# Patient Record
Sex: FEMALE | Race: WHITE | NOT HISPANIC OR LATINO | Employment: UNEMPLOYED | URBAN - METROPOLITAN AREA
[De-identification: names, ages, dates, MRNs, and addresses within clinical notes are randomized per-mention and may not be internally consistent; named-entity substitution may affect disease eponyms.]

---

## 2017-07-24 ENCOUNTER — OFFICE VISIT (OUTPATIENT)
Dept: OBSTETRICS AND GYNECOLOGY | Facility: CLINIC | Age: 32
End: 2017-07-24
Payer: MEDICAID

## 2017-07-24 ENCOUNTER — PROCEDURE VISIT (OUTPATIENT)
Dept: OBSTETRICS AND GYNECOLOGY | Facility: CLINIC | Age: 32
End: 2017-07-24
Payer: MEDICAID

## 2017-07-24 VITALS
HEIGHT: 66 IN | WEIGHT: 130 LBS | BODY MASS INDEX: 20.89 KG/M2 | RESPIRATION RATE: 16 BRPM | HEART RATE: 74 BPM | DIASTOLIC BLOOD PRESSURE: 68 MMHG | SYSTOLIC BLOOD PRESSURE: 100 MMHG

## 2017-07-24 DIAGNOSIS — Z32.01 PREGNANCY EXAMINATION OR TEST, POSITIVE RESULT: ICD-10-CM

## 2017-07-24 DIAGNOSIS — Z32.00 POSSIBLE PREGNANCY: ICD-10-CM

## 2017-07-24 DIAGNOSIS — O09.30 NO PRENATAL CARE IN CURRENT PREGNANCY, UNSPECIFIED TRIMESTER: ICD-10-CM

## 2017-07-24 DIAGNOSIS — Z32.01 PREGNANCY EXAMINATION OR TEST, POSITIVE RESULT: Primary | ICD-10-CM

## 2017-07-24 DIAGNOSIS — Z12.4 CERVICAL CANCER SCREENING: ICD-10-CM

## 2017-07-24 LAB
B-HCG UR QL: POSITIVE
CTP QC/QA: YES

## 2017-07-24 PROCEDURE — 76805 OB US >/= 14 WKS SNGL FETUS: CPT | Mod: 26,S$PBB,, | Performed by: OBSTETRICS & GYNECOLOGY

## 2017-07-24 PROCEDURE — 99999 PR PBB SHADOW E&M-EST. PATIENT-LVL III: CPT | Mod: PBBFAC,,, | Performed by: OBSTETRICS & GYNECOLOGY

## 2017-07-24 PROCEDURE — 81025 URINE PREGNANCY TEST: CPT | Mod: PBBFAC | Performed by: OBSTETRICS & GYNECOLOGY

## 2017-07-24 PROCEDURE — 99213 OFFICE O/P EST LOW 20 MIN: CPT | Mod: PBBFAC | Performed by: OBSTETRICS & GYNECOLOGY

## 2017-07-24 PROCEDURE — 88175 CYTOPATH C/V AUTO FLUID REDO: CPT

## 2017-07-24 PROCEDURE — 99203 OFFICE O/P NEW LOW 30 MIN: CPT | Mod: S$PBB,TH,25, | Performed by: OBSTETRICS & GYNECOLOGY

## 2017-07-24 PROCEDURE — 87591 N.GONORRHOEAE DNA AMP PROB: CPT

## 2017-07-24 NOTE — PROGRESS NOTES
Subjective:   Patient ID: Nery Brown is a 32 y.o. y.o. female.     Chief Complaint: Missed Menses       History of Present Illness:    Nery presents today complaining of amenorrhea. No LMP recorded (lmp unknown).. Prior menstrual cycles have been irregular. She reports notiving fetal movement for a few weeks and that's how she realized she was pregnant. UPT is positive.       History reviewed. No pertinent past medical history.  History reviewed. No pertinent surgical history.  Social History     Social History    Marital status: Single     Spouse name: N/A    Number of children: N/A    Years of education: N/A     Social History Main Topics    Smoking status: Current Every Day Smoker     Packs/day: 0.50     Types: Cigarettes    Smokeless tobacco: None    Alcohol use No    Drug use: No    Sexual activity: Yes     Partners: Male     Other Topics Concern    None     Social History Narrative    None     Family History   Problem Relation Age of Onset    Ovarian cancer Maternal Grandmother     Hypertension Father     Stroke Father     Breast cancer Sister      OB History    Para Term  AB Living   5 4 2 2 1 4   SAB TAB Ectopic Multiple Live Births         0 1      # Outcome Date GA Lbr Harshal/2nd Weight Sex Delivery Anes PTL Lv   5  //16   2.676 kg (5 lb 14.4 oz) F Vag-Spont None  ALLEN      Complications: Precipitous delivery,Precipitate labor   4 AB            3             2 Term            1 Term                     ROS:   Review of Systems   Constitutional: Negative for chills, diaphoresis, fatigue, fever and unexpected weight change.   HENT: Negative for congestion, hearing loss, rhinorrhea and sore throat.    Eyes: Negative for pain, discharge and visual disturbance.   Respiratory: Negative for apnea, cough, shortness of breath and wheezing.    Cardiovascular: Negative for chest pain, palpitations and leg swelling.   Gastrointestinal: Negative for abdominal pain,  constipation, diarrhea, nausea and vomiting.   Endocrine: Negative for cold intolerance and heat intolerance.   Genitourinary: Negative for difficulty urinating, dyspareunia, dysuria, flank pain, frequency, genital sores, hematuria, menstrual problem, pelvic pain, vaginal bleeding, vaginal discharge and vaginal pain.   Musculoskeletal: Negative for arthralgias, back pain and joint swelling.   Skin: Negative for rash.   Neurological: Negative for dizziness, weakness, light-headedness, numbness and headaches.   Psychiatric/Behavioral: Negative for agitation and confusion. The patient is not nervous/anxious.            Objective:   Vital Signs:  Vitals:    07/24/17 1259   BP: 100/68   Pulse: 74   Resp: 16       Physical Exam:  General:  alert,normal appearing gravid female   Head: Normocephalic, atraumatic   Neck: Supple, Normal ROM   Respiratory: Normal effort   Neuro/Psych: Alert and oriented, appropriate mood and affect   Abdomen:  soft, non-tender; bowel sounds normal, fetal heart tones 133 bpm   Pelvis: External genitalia: normal general appearance  Urinary system: urethral meatus normal, bladder nontender  Vaginal: normal mucosa without prolapse or lesions  Cervix: friable, pap obtained; cervix 0/20/-3  Uterus: gravid  Adnexa: non palpable       Assessment:      1. Pregnancy examination or test, positive result    2. Possible pregnancy    3. Cervical cancer screening          Plan:        Pregnancy examination or test, positive result  -     US OB/GYN Procedure ([facility]) - Extended List; Future  -     Type & Screen - Ob Profile; Future; Expected date: 07/24/2017  -     CBC auto differential; Future; Expected date: 07/24/2017  -     HIV-1 and HIV-2 antibodies; Future; Expected date: 07/24/2017  -     Hepatitis B surface antigen; Future; Expected date: 07/24/2017  -     Rubella antibody, IgG; Future; Expected date: 07/24/2017  -     RPR; Future; Expected date: 07/24/2017  -     C. trachomatis/N. gonorrhoeae by  AMP DNA Cervix; Standing    Possible pregnancy  -     POCT Urine Pregnancy    Cervical cancer screening  -     Liquid-based pap smear, screening        1. Pap today  2. Prenatal labs ordered and GC/CT performed.  3. Ultrasound today  4. Follow up in 1 weeks.    Patient was counseled today on proper weight gain based on the [facility]'s recommendations based on her pre-pregnancy weight. Discussed foods to avoid in pregnancy (i.e. sushi, fish that are high in mercury, deli meat, and unpasteurized cheeses). Discussed prenatal vitamin options (i.e. stool softener, DHA). She was also counseled on safe, healthy behavior as well as medications safe in pregnancy.

## 2017-07-27 LAB
C TRACH DNA SPEC QL NAA+PROBE: NOT DETECTED
N GONORRHOEA DNA SPEC QL NAA+PROBE: NOT DETECTED

## 2017-08-14 ENCOUNTER — TELEPHONE (OUTPATIENT)
Dept: OBSTETRICS AND GYNECOLOGY | Facility: CLINIC | Age: 32
End: 2017-08-14

## 2017-08-14 DIAGNOSIS — Z34.81 ENCOUNTER FOR SUPERVISION OF OTHER NORMAL PREGNANCY IN FIRST TRIMESTER: Primary | ICD-10-CM

## 2017-09-09 ENCOUNTER — HOSPITAL ENCOUNTER (INPATIENT)
Facility: HOSPITAL | Age: 32
LOS: 1 days | Discharge: HOME | End: 2017-09-10
Attending: OBSTETRICS & GYNECOLOGY | Admitting: OBSTETRICS & GYNECOLOGY

## 2017-09-09 DIAGNOSIS — O09.33 NO PRENATAL CARE IN CURRENT PREGNANCY IN THIRD TRIMESTER: Primary | ICD-10-CM

## 2017-09-09 DIAGNOSIS — O09.893 HISTORY OF PRETERM DELIVERY, CURRENTLY PREGNANT IN THIRD TRIMESTER: ICD-10-CM

## 2017-09-09 DIAGNOSIS — F19.11 HISTORY OF DRUG ABUSE: ICD-10-CM

## 2017-09-09 LAB
ABO + RH BLD: NORMAL
AMPHET+METHAMPHET UR QL: NORMAL
BARBITURATES UR QL SCN>200 NG/ML: NEGATIVE
BASOPHILS # BLD AUTO: 0.04 K/UL
BASOPHILS NFR BLD: 0.3 %
BENZODIAZ UR QL SCN>200 NG/ML: NEGATIVE
BLD GP AB SCN CELLS X3 SERPL QL: NORMAL
BZE UR QL SCN: NEGATIVE
CANNABINOIDS UR QL SCN: NEGATIVE
CREAT UR-MCNC: 49 MG/DL
DIFFERENTIAL METHOD: ABNORMAL
EOSINOPHIL # BLD AUTO: 0.1 K/UL
EOSINOPHIL NFR BLD: 0.9 %
ERYTHROCYTE [DISTWIDTH] IN BLOOD BY AUTOMATED COUNT: 14.4 %
FETAL CELL SCN BLD QL ROSETTE: NORMAL
HCT VFR BLD AUTO: 29.8 %
HGB BLD-MCNC: 9.4 G/DL
LYMPHOCYTES # BLD AUTO: 1.7 K/UL
LYMPHOCYTES NFR BLD: 11.7 %
MCH RBC QN AUTO: 27.9 PG
MCHC RBC AUTO-ENTMCNC: 31.5 G/DL
MCV RBC AUTO: 88 FL
METHADONE UR QL SCN>300 NG/ML: NEGATIVE
MONOCYTES # BLD AUTO: 1 K/UL
MONOCYTES NFR BLD: 7 %
NEUTROPHILS # BLD AUTO: 11.4 K/UL
NEUTROPHILS NFR BLD: 80.1 %
OPIATES UR QL SCN: NEGATIVE
PCP UR QL SCN>25 NG/ML: NEGATIVE
PLATELET # BLD AUTO: 290 K/UL
PMV BLD AUTO: 10 FL
RBC # BLD AUTO: 3.37 M/UL
TOXICOLOGY INFORMATION: NORMAL
WBC # BLD AUTO: 14.24 K/UL

## 2017-09-09 PROCEDURE — 25000003 PHARM REV CODE 250: Performed by: OBSTETRICS & GYNECOLOGY

## 2017-09-09 PROCEDURE — 86701 HIV-1ANTIBODY: CPT

## 2017-09-09 PROCEDURE — 72100002 HC LABOR CARE, 1ST 8 HOURS

## 2017-09-09 PROCEDURE — 86901 BLOOD TYPING SEROLOGIC RH(D): CPT

## 2017-09-09 PROCEDURE — 86592 SYPHILIS TEST NON-TREP QUAL: CPT

## 2017-09-09 PROCEDURE — 80307 DRUG TEST PRSMV CHEM ANLYZR: CPT

## 2017-09-09 PROCEDURE — 36415 COLL VENOUS BLD VENIPUNCTURE: CPT

## 2017-09-09 PROCEDURE — 72200004 HC VAGINAL DELIVERY LEVEL I

## 2017-09-09 PROCEDURE — 27000339 *HC DAILY SUPPLY KIT

## 2017-09-09 PROCEDURE — 27200120 HC KIT IV START (RUSH ONLY)

## 2017-09-09 PROCEDURE — 85461 HEMOGLOBIN FETAL: CPT

## 2017-09-09 PROCEDURE — 86900 BLOOD TYPING SEROLOGIC ABO: CPT

## 2017-09-09 PROCEDURE — 59409 OBSTETRICAL CARE: CPT | Mod: ,,, | Performed by: OBSTETRICS & GYNECOLOGY

## 2017-09-09 PROCEDURE — 85025 COMPLETE CBC W/AUTO DIFF WBC: CPT

## 2017-09-09 PROCEDURE — 11000001 HC ACUTE MED/SURG PRIVATE ROOM

## 2017-09-09 PROCEDURE — 99201 *HC E&M-NEW PATIENT-LVL I: CPT

## 2017-09-09 RX ORDER — IBUPROFEN 800 MG/1
800 TABLET ORAL EVERY 8 HOURS
Status: CANCELLED | OUTPATIENT
Start: 2017-09-10

## 2017-09-09 RX ORDER — DIPHENHYDRAMINE HYDROCHLORIDE 50 MG/ML
25 INJECTION INTRAMUSCULAR; INTRAVENOUS EVERY 4 HOURS PRN
Status: DISCONTINUED | OUTPATIENT
Start: 2017-09-09 | End: 2017-09-10 | Stop reason: HOSPADM

## 2017-09-09 RX ORDER — PRENATAL WITH FERROUS FUM AND FOLIC ACID 3080; 920; 120; 400; 22; 1.84; 3; 20; 10; 1; 12; 200; 27; 25; 2 [IU]/1; [IU]/1; MG/1; [IU]/1; MG/1; MG/1; MG/1; MG/1; MG/1; MG/1; UG/1; MG/1; MG/1; MG/1; MG/1
1 TABLET ORAL DAILY
Status: DISCONTINUED | OUTPATIENT
Start: 2017-09-09 | End: 2017-09-10 | Stop reason: HOSPADM

## 2017-09-09 RX ORDER — SODIUM CHLORIDE, SODIUM LACTATE, POTASSIUM CHLORIDE, CALCIUM CHLORIDE 600; 310; 30; 20 MG/100ML; MG/100ML; MG/100ML; MG/100ML
INJECTION, SOLUTION INTRAVENOUS CONTINUOUS
Status: DISCONTINUED | OUTPATIENT
Start: 2017-09-09 | End: 2017-09-09

## 2017-09-09 RX ORDER — BUTORPHANOL TARTRATE 2 MG/ML
2 INJECTION INTRAMUSCULAR; INTRAVENOUS
Status: DISCONTINUED | OUTPATIENT
Start: 2017-09-09 | End: 2017-09-09

## 2017-09-09 RX ORDER — MISOPROSTOL 200 UG/1
600 TABLET ORAL
Status: DISCONTINUED | OUTPATIENT
Start: 2017-09-09 | End: 2017-09-09

## 2017-09-09 RX ORDER — KETOROLAC TROMETHAMINE 30 MG/ML
30 INJECTION, SOLUTION INTRAMUSCULAR; INTRAVENOUS EVERY 6 HOURS
Status: CANCELLED | OUTPATIENT
Start: 2017-09-09 | End: 2017-09-10

## 2017-09-09 RX ORDER — SIMETHICONE 80 MG
1 TABLET,CHEWABLE ORAL EVERY 6 HOURS PRN
Status: DISCONTINUED | OUTPATIENT
Start: 2017-09-09 | End: 2017-09-10 | Stop reason: HOSPADM

## 2017-09-09 RX ORDER — ONDANSETRON 2 MG/ML
4 INJECTION INTRAMUSCULAR; INTRAVENOUS EVERY 8 HOURS PRN
Status: DISCONTINUED | OUTPATIENT
Start: 2017-09-09 | End: 2017-09-10 | Stop reason: HOSPADM

## 2017-09-09 RX ORDER — ACETAMINOPHEN 325 MG/1
650 TABLET ORAL EVERY 6 HOURS PRN
Status: DISCONTINUED | OUTPATIENT
Start: 2017-09-09 | End: 2017-09-10 | Stop reason: HOSPADM

## 2017-09-09 RX ORDER — DIPHENHYDRAMINE HCL 25 MG
25 CAPSULE ORAL EVERY 4 HOURS PRN
Status: DISCONTINUED | OUTPATIENT
Start: 2017-09-09 | End: 2017-09-10 | Stop reason: HOSPADM

## 2017-09-09 RX ORDER — OXYCODONE AND ACETAMINOPHEN 5; 325 MG/1; MG/1
1 TABLET ORAL EVERY 4 HOURS PRN
Status: DISCONTINUED | OUTPATIENT
Start: 2017-09-09 | End: 2017-09-10 | Stop reason: HOSPADM

## 2017-09-09 RX ORDER — OXYTOCIN/RINGER'S LACTATE 20/1000 ML
41.65 PLASTIC BAG, INJECTION (ML) INTRAVENOUS CONTINUOUS
Status: ACTIVE | OUTPATIENT
Start: 2017-09-09 | End: 2017-09-09

## 2017-09-09 RX ORDER — DOCUSATE SODIUM 100 MG/1
200 CAPSULE, LIQUID FILLED ORAL 2 TIMES DAILY PRN
Status: DISCONTINUED | OUTPATIENT
Start: 2017-09-09 | End: 2017-09-10 | Stop reason: HOSPADM

## 2017-09-09 RX ORDER — HYDROCORTISONE 25 MG/G
CREAM TOPICAL 3 TIMES DAILY PRN
Status: DISCONTINUED | OUTPATIENT
Start: 2017-09-09 | End: 2017-09-10 | Stop reason: HOSPADM

## 2017-09-09 RX ORDER — OXYTOCIN/RINGER'S LACTATE 20/1000 ML
2 PLASTIC BAG, INJECTION (ML) INTRAVENOUS CONTINUOUS
Status: DISCONTINUED | OUTPATIENT
Start: 2017-09-09 | End: 2017-09-09

## 2017-09-09 RX ORDER — IBUPROFEN 600 MG/1
600 TABLET ORAL EVERY 6 HOURS PRN
Status: DISCONTINUED | OUTPATIENT
Start: 2017-09-09 | End: 2017-09-10 | Stop reason: HOSPADM

## 2017-09-09 RX ORDER — ONDANSETRON 4 MG/1
8 TABLET, ORALLY DISINTEGRATING ORAL EVERY 8 HOURS PRN
Status: DISCONTINUED | OUTPATIENT
Start: 2017-09-09 | End: 2017-09-09

## 2017-09-09 RX ADMIN — IBUPROFEN 600 MG: 600 TABLET, FILM COATED ORAL at 08:09

## 2017-09-09 RX ADMIN — OXYCODONE HYDROCHLORIDE AND ACETAMINOPHEN 1 TABLET: 5; 325 TABLET ORAL at 05:09

## 2017-09-09 RX ADMIN — OXYCODONE HYDROCHLORIDE AND ACETAMINOPHEN 1 TABLET: 5; 325 TABLET ORAL at 11:09

## 2017-09-09 RX ADMIN — Medication 333 MILLI-UNITS/MIN: at 11:09

## 2017-09-09 RX ADMIN — IBUPROFEN 600 MG: 600 TABLET, FILM COATED ORAL at 11:09

## 2017-09-09 RX ADMIN — Medication 41.65 MILLI-UNITS/MIN: at 12:09

## 2017-09-09 RX ADMIN — OXYCODONE HYDROCHLORIDE AND ACETAMINOPHEN 1 TABLET: 5; 325 TABLET ORAL at 09:09

## 2017-09-09 NOTE — L&D DELIVERY NOTE
Delivery Information for  Adryan Brown    Birth information:  YOB: 2017   Time of birth: 11:03 AM   Sex: male   Head Delivery Date/Time: 2017 11:03 AM   Delivery type: Vaginal, Spontaneous Delivery   Gestational Age: 33w1d    Delivery Providers    Delivering clinician:  Bashir Sutton MD   Other personnel:   Provider Role   Madelin Priest RN Registered Nurse   Flaquita Chen, RN Registered Nurse   Shelly Velazco Technician   Dang Engle, JARERLL                 Measurements    Weight:  2336 g Length:  47 cm          [place] Assessment    Living status:  Living  Apgars:     1 Minute:   5 Minute:   10 Minute:   15 Minute:   20 Minute:     Skin Color:   1  1       Heart Rate:   2  2       Reflex Irritability:   2  2       Muscle Tone:   1  1       Respiratory Effort:   1  2       Total:   7  8               Apgars Assigned By:  MADELIN PRIEST RN         Assisted Delivery Details:    Forceps attempted?:  No  Vacuum extractor attempted?:  No         Shoulder Dystocia    Shoulder dystocia present?:  No           Presentation and Position    Presentation:   Vertex   Position:   Left    Occiput    Anterior            Interventions/Resuscitation    Method:  Bulb Suctioning, Tactile Stimulation       Cord    Vessels:  3 vessels  Complications:  None  Delayed Cord Clamping?:  No  Cord Clamped Date/Time:  2017 11:05 AM  Cord Blood Disposition:  Lab  Gases Sent?:  No  Stem Cell Collection (by MD):  No       Placenta    Date and time:  2017 11:06 AM  Removal:  Spontaneous  Appearance:  Intact  Placenta disposition:  discarded           Labor Events:       labor:       Labor Onset Date/Time:         Dilation Complete Date/Time:         Start Pushing Date/Time:       Rupture Date/Time:              Rupture type:           Fluid Amount:        Fluid Color:        Fluid Odor:        Membrane Status (PeriCalm):        Rupture Date/Time (PeriCalm):        Fluid Amount (PeriCalm):         Fluid Color (PeriCalm):         steroids: None     Antibiotics given for GBS: No     Induction:       Indications for induction:        Augmentation:       Indications for augmentation:       Labor complications:       Additional complications: Precipitate Labor;Precipitous Delivery        Cervical ripening:                     Delivery:      Episiotomy: None     Indication for Episiotomy:       Perineal Lacerations: None Repaired:      Periurethral Laceration: none Repaired:     Labial Laceration: none Repaired:     Sulcus Laceration: none Repaired:     Vaginal Laceration: No Repaired:     Cervical Laceration: No Repaired:     Repair suture: None     Repair # of packets: 0     Vaginal delivery QBL (mL): 50      QBL (mL): 0     Combined Blood Loss (mL): 50     Vaginal Sweep Performed: Yes     Surgicount Correct:         Other providers:       Anesthesia    Method:  None          Details (if applicable):  Trial of Labor      Categorization:      Priority:     Indications for :     Incision Type:       Additional  information:  Forceps:    Vacuum:    Breech:    Observed anomalies    Other (Comments): Id band number [id number]

## 2017-09-09 NOTE — H&P
[facility]  Obstetrics  History & Physical    Patient Name: Nery Brown  MRN: [id number]  Admission Date: 2017  Primary Care Provider: Primary Doctor No    Subjective:     Principal Problem:<principal problem not specified>     History of Present Illness:Patient presents to labor and delivery in the throws of active labor.  Patient states that she is had one prenatal visit although limited records are available.  Patient gives a history of this being her fourth pregnancy with 3 other  deliveries.  Patient reports a due date of 2017.  This would make the patient is 6 weeks.  Patient reports a history of drug abuse but states her last use was a month ago.  Patient appears to be under the influence at present.  Patient presented completely dilated and       Obstetric HPI:  Patient reports strong uterine contractions since 8 AM this morning , active fetal movement, No vaginal bleeding , Yes loss of fluid and tannish ruptured membranes at delivery clear fluid     This pregnancy has been complicated by no prenatal care, history of drug abuse, history of  delivery,  delivery    Obstetric History       T2      L4     SAB0   TAB0   Ectopic0   Multiple0   Live Births1       # Outcome Date GA Lbr Harshal/2nd Weight Sex Delivery Anes PTL Lv   6 Current            5  16   2.676 kg (5 lb 14.4 oz) F Vag-Spont None  ALLEN      Name: LESLIE, DAVID KINGSLEY      Complications: Precipitous delivery,Precipitate labor      Apgar1:  7                Apgar5: 7   4 AB            3             2 Term            1 Term                 No past medical history on file.  No past surgical history on file.    PTA Medications   Medication Sig    PNV,calcium 72-iron-folic acid (PRENATAL PLUS, CALCIUM CARB,) 27 mg iron- 1 mg Tab Take 1 tablet (1 each total) by mouth once daily.    PRENATAL VIT/IRON FUMARATE/FA (PRENATAL 1+1 ORAL) Take by mouth.       Review of  patient's allergies indicates:  No Known Allergies     Family History     Problem Relation (Age of Onset)    Breast cancer Sister    Hypertension Father    Ovarian cancer Maternal Grandmother    Stroke Father        Social History Main Topics    Smoking status: Current Every Day Smoker     Packs/day: 0.50     Types: Cigarettes    Smokeless tobacco: Not on file    Alcohol use No    Drug use: No    Sexual activity: Yes     Partners: Male     Review of Systems   All other systems reviewed and are negative.     Objective:     Vital Signs (Most Recent):    Vital Signs (24h Range):           There is no height or weight on file to calculate BMI.    FHT: Unable  to obtain in a monitoring for delivery    Physical Exam:   Constitutional: She appears well-developed and well-nourished.       Cardiovascular: Normal rate, regular rhythm and normal heart sounds.     Pulmonary/Chest: Effort normal and breath sounds normal.        Abdominal: Soft. Bowel sounds are normal.                       Cervix:  Dilation:  10  Effacement:  100%  Station: 3  Presentation: Vertex     Significant Labs:  Lab Results   Component Value Date    GROUPTRH A NEG 2016    HEPBSAG Negative 2016       None    Assessment/Plan:     32 y.o. female  at Unknown for:    History of  delivery, currently pregnant in third trimester    Precipitous delivery        History of drug abuse    Will obtain urine drug screen        No prenatal care in current pregnancy in third trimester    Obtain prenatal lab work            Bashir Sutton MD  Obstetrics  [facility]

## 2017-09-09 NOTE — HOSPITAL COURSE
Patient presented and delivered precipitously.  Placenta delivered without difficulty.  See delivery note  Postpartum day #1 Patient resting comfortably this morning.  Patient's baby was transferred to [facility] and she would like to go and see her baby.  Patient desires to smoke.  Patient has not seen  this hospitalization however she has seen  with a previous pregnancy and will follow-up at [facility].

## 2017-09-09 NOTE — PROGRESS NOTES
"Patient came into L&D dept per W/C per ER tech, patient verbally hollering and sitting with buttock off of chair stating, "I need to pooh".  Assisted to bed, dark brown mucous plug noted to introitus and on thigh. EFM/[place] applied, VS taken, assisted patient to blow with ctx's, patient pushing with ctx's, encouraged patient to relax and blow with ctx's, SVE reveals 10 cm, sta +1, water bag bulging, called for help, precip tray readily available in bed.  "

## 2017-09-09 NOTE — SUBJECTIVE & OBJECTIVE
Obstetric HPI:  Patient reports strong uterine contractions since 8 AM this morning , active fetal movement, No vaginal bleeding , Yes loss of fluid and tannish ruptured membranes at delivery clear fluid     This pregnancy has been complicated by no prenatal care, history of drug abuse, history of  delivery,  delivery    Obstetric History       T2      L4     SAB0   TAB0   Ectopic0   Multiple0   Live Births1       # Outcome Date GA Lbr Harshal/2nd Weight Sex Delivery Anes PTL Lv   6 Current            5  16   2.676 kg (5 lb 14.4 oz) F Vag-Spont None  ALLEN      Name: DAVID NELSON      Complications: Precipitous delivery,Precipitate labor      Apgar1:  7                Apgar5: 7   4 AB            3             2 Term            1 Term                 No past medical history on file.  No past surgical history on file.    PTA Medications   Medication Sig    PNV,calcium 72-iron-folic acid (PRENATAL PLUS, CALCIUM CARB,) 27 mg iron- 1 mg Tab Take 1 tablet (1 each total) by mouth once daily.    PRENATAL VIT/IRON FUMARATE/FA (PRENATAL 1+1 ORAL) Take by mouth.       Review of patient's allergies indicates:  No Known Allergies     Family History     Problem Relation (Age of Onset)    Breast cancer Sister    Hypertension Father    Ovarian cancer Maternal Grandmother    Stroke Father        Social History Main Topics    Smoking status: Current Every Day Smoker     Packs/day: 0.50     Types: Cigarettes    Smokeless tobacco: Not on file    Alcohol use No    Drug use: No    Sexual activity: Yes     Partners: Male     Review of Systems   All other systems reviewed and are negative.     Objective:     Vital Signs (Most Recent):    Vital Signs (24h Range):           There is no height or weight on file to calculate BMI.    FHT: Unable  to obtain in a monitoring for delivery    Physical Exam:   Constitutional: She appears well-developed and well-nourished.       Cardiovascular:  Normal rate, regular rhythm and normal heart sounds.     Pulmonary/Chest: Effort normal and breath sounds normal.        Abdominal: Soft. Bowel sounds are normal.                       Cervix:  Dilation:  10  Effacement:  100%  Station: 3  Presentation: Vertex     Significant Labs:  Lab Results   Component Value Date    [facility]    HEPBSAG Negative 08/23/2016       None

## 2017-09-09 NOTE — PROGRESS NOTES
Delivered  supporting perineum, head delivered with gentle pushing per patient, shoulders delivered, and  delivered, time noted, Dr SCHUYLER Sutton walked in behind me immediately after  delivered and attended to patient for the cord and placenta, assisted with  at warmer.

## 2017-09-09 NOTE — PLAN OF CARE
Problem: Patient Care Overview  Goal: Plan of Care Review  Outcome: Ongoing (interventions implemented as appropriate)  Pt stable, ambulating and voiding without difficulty. Belle regular diet. Pain controlled. Infant transported to [facility] [place]. Emotional support given to pt. Pt's significant other visited pt shortly after infant was transported. Pt plans to formula feed. States she will visit infant at [facility] tomorrow once she is discharged. .

## 2017-09-10 VITALS
OXYGEN SATURATION: 100 % | RESPIRATION RATE: 18 BRPM | SYSTOLIC BLOOD PRESSURE: 120 MMHG | HEIGHT: 66 IN | HEART RATE: 84 BPM | DIASTOLIC BLOOD PRESSURE: 77 MMHG | WEIGHT: 130 LBS | BODY MASS INDEX: 20.89 KG/M2 | TEMPERATURE: 96 F

## 2017-09-10 LAB
BASOPHILS # BLD AUTO: 0.03 K/UL
BASOPHILS NFR BLD: 0.3 %
DIFFERENTIAL METHOD: ABNORMAL
EOSINOPHIL # BLD AUTO: 0.1 K/UL
EOSINOPHIL NFR BLD: 1.2 %
ERYTHROCYTE [DISTWIDTH] IN BLOOD BY AUTOMATED COUNT: 14.2 %
HCT VFR BLD AUTO: 25.9 %
HGB BLD-MCNC: 8.1 G/DL
HIV1+2 IGG SERPL QL IA.RAPID: NEGATIVE
LYMPHOCYTES # BLD AUTO: 2.4 K/UL
LYMPHOCYTES NFR BLD: 20.7 %
MCH RBC QN AUTO: 27.7 PG
MCHC RBC AUTO-ENTMCNC: 31.3 G/DL
MCV RBC AUTO: 89 FL
MONOCYTES # BLD AUTO: 0.7 K/UL
MONOCYTES NFR BLD: 5.7 %
NEUTROPHILS # BLD AUTO: 8.3 K/UL
NEUTROPHILS NFR BLD: 72.1 %
PLATELET # BLD AUTO: 318 K/UL
PMV BLD AUTO: 10.1 FL
RBC # BLD AUTO: 2.92 M/UL
TSH SERPL DL<=0.005 MIU/L-ACNC: 2.31 UIU/ML
WBC # BLD AUTO: 11.46 K/UL

## 2017-09-10 PROCEDURE — 86703 HIV-1/HIV-2 1 RESULT ANTBDY: CPT

## 2017-09-10 PROCEDURE — 81220 CFTR GENE COM VARIANTS: CPT

## 2017-09-10 PROCEDURE — 99238 HOSP IP/OBS DSCHRG MGMT 30/<: CPT | Mod: ,,, | Performed by: OBSTETRICS & GYNECOLOGY

## 2017-09-10 PROCEDURE — 25000003 PHARM REV CODE 250: Performed by: OBSTETRICS & GYNECOLOGY

## 2017-09-10 PROCEDURE — 84443 ASSAY THYROID STIM HORMONE: CPT

## 2017-09-10 PROCEDURE — 86762 RUBELLA ANTIBODY: CPT

## 2017-09-10 PROCEDURE — 85025 COMPLETE CBC W/AUTO DIFF WBC: CPT

## 2017-09-10 PROCEDURE — 87340 HEPATITIS B SURFACE AG IA: CPT

## 2017-09-10 PROCEDURE — 36415 COLL VENOUS BLD VENIPUNCTURE: CPT

## 2017-09-10 RX ORDER — IBUPROFEN 600 MG/1
600 TABLET ORAL EVERY 6 HOURS PRN
Qty: 20 TABLET | Refills: 2 | Status: SHIPPED | OUTPATIENT
Start: 2017-09-10 | End: 2018-12-10 | Stop reason: ALTCHOICE

## 2017-09-10 RX ADMIN — PRENATAL VIT W/ FE FUMARATE-FA TAB 27-0.8 MG 1 EACH: 27-0.8 TAB at 08:09

## 2017-09-10 RX ADMIN — OXYCODONE HYDROCHLORIDE AND ACETAMINOPHEN 1 TABLET: 5; 325 TABLET ORAL at 08:09

## 2017-09-10 RX ADMIN — IBUPROFEN 600 MG: 600 TABLET, FILM COATED ORAL at 04:09

## 2017-09-10 RX ADMIN — OXYCODONE HYDROCHLORIDE AND ACETAMINOPHEN 1 TABLET: 5; 325 TABLET ORAL at 04:09

## 2017-09-10 NOTE — NURSING
RN to room to do vital signs, give pain medication and to give Rhogam shot.  At this time lab also to room for blood work.  Pt refuses to receive Rhogam shot at this time states it hurts too bad and she wishes to sleep not hurt.  Rhogam sent back to lab and pt instructed that she will receive it later in the day when she is more awake.

## 2017-09-10 NOTE — DISCHARGE SUMMARY
[facility] Mother Baby Unit  Obstetrics  Discharge Summary      Patient Name: Nery Brown  MRN: [id number]  Admission Date: 2017  Hospital Length of Stay: 1 days  Discharge Date and Time:  09/10/2017 9:54 AM  Attending Physician: Bashir Sutton MD   Discharging Provider: Bashir Sutton MD  Primary Care Provider: Primary Doctor No    HPI: Patient presents to labor and delivery in the throws of active labor.  Patient states that she is had one prenatal visit although limited records are available.  Patient gives a history of this being her fourth pregnancy with 3 other  deliveries.  Patient reports a due date of 2017.  This would make the patient is 6 weeks.  Patient reports a history of drug abuse but states her last use was a month ago.  Patient appears to be under the influence at present.  Patient presented completely dilated and .    * No surgery found *     Hospital Course:   Patient presented and delivered precipitously.  Placenta delivered without difficulty.  See delivery note  Postpartum day #1 Patient resting comfortably this morning.  Patient's baby was transferred to [facility] and she would like to go and see her baby.  Patient desires to smoke.  Patient has not seen  this hospitalization however she has seen  with a previous pregnancy and will follow-up at [facility].    Consults         Status Ordering Provider     Inpatient consult to Social Work  Once     Provider:  (Not yet assigned)    Acknowledged BASHIR SUTTON          Final Active Diagnoses:    Diagnosis Date Noted POA    PRINCIPAL PROBLEM:   labor in third trimester with  delivery [O60.14X0] 2017 Yes    No prenatal care in current pregnancy in third trimester [O09.33] 2017 Not Applicable    History of drug abuse [Z87.898] 2017 Yes    History of  delivery, currently pregnant in third trimester [O09.213] 2017 Not Applicable       Problems Resolved During this Admission:    Diagnosis Date Noted Date Resolved POA            Feeding Method: bottle    Immunizations     Date Immunization Status Dose Route/Site Given by    09/09/17 1231 MMR Incomplete 0.5 mL Subcutaneous/Left deltoid     09/09/17 1425 Tdap Deleted 0.5 mL Intramuscular/Left deltoid     09/09/17 1134 Rho (D) Immune Globulin - IM Incomplete 300 mcg Intramuscular/     09/09/17 1525 Tdap Incomplete 0.5 mL Intramuscular/Left deltoid           Delivery:    Episiotomy: None   Lacerations: None   Repair suture: None   Repair # of packets: 0   Blood loss (ml): 50     Birth information:  YOB: 2017   Time of birth: 11:03 AM   Sex: male   Delivery type: Vaginal, Spontaneous Delivery   Gestational Age: 33w1d    Delivery Clinician:      Other providers:       Additional  information:  Forceps:    Vacuum:    Breech:    Observed anomalies      Living?:           APGARS  One minute Five minutes Ten minutes   Skin color:         Heart rate:         Grimace:         Muscle tone:         Breathing:         Totals: 7  8        Placenta: Delivered:       appearance    Pending Diagnostic Studies:     Procedure Component Value Units Date/Time    RPR [[id number]] Collected:  09/09/17 1131    Order Status:  Sent Lab Status:  In process Updated:  09/09/17 1135    Specimen:  Blood from Blood           Discharged Condition: good    Disposition:     Follow Up:    Patient Instructions:     Cystic Fibrosis Mutation Panel   Standing Status: Future  Standing Exp. Date: 11/08/18     Hepatitis B surface antigen   Standing Status: Future  Standing Exp. Date: 11/08/18     HIV-1 and HIV-2 antibodies   Standing Status: Future  Standing Exp. Date: 11/08/18     Rubella antibody, IgG   Standing Status: Future  Standing Exp. Date: 11/08/18     TSH   Standing Status: Future  Standing Exp. Date: 11/08/18       Medications:  Current Discharge Medication List      CONTINUE these medications which have NOT CHANGED     Details   PNV,calcium 72-iron-folic acid (PRENATAL PLUS, CALCIUM CARB,) 27 mg iron- 1 mg Tab Take 1 tablet (1 each total) by mouth once daily.  Qty: 30 tablet, Refills: 11    Comments: OK to substitute comparable prenatal vitamin covered by pt's insurance  Associated Diagnoses: Encounter for supervision of other normal pregnancy in first trimester      PRENATAL VIT/IRON FUMARATE/FA (PRENATAL 1+1 ORAL) Take by mouth.             Bashir Sutton MD  Obstetrics  [facility] - Mother Baby Unit

## 2017-09-10 NOTE — NURSING
"Pt refused rhogam injection. States "It hurts too much". Discussed the importance of receiving rhogam for further pregnancies and having babies with + blood types. Pt states "I'm not having any more kids so I don't want it."  Will notify MD.  "

## 2017-09-10 NOTE — DISCHARGE INSTRUCTIONS
Postpartum Discharge Instructions:    · No heavy lifting, straining, frequent rest periods  · Pelvic rest--no douching, tampons, or intercourse until released by MD  · Talk to your doctor about birth control--remember breastfeeding is not a method of birth control  · Notify MD if bleeding becomes heavier than usual and if large clots, painful cramping,or foul odor develops.   Vaginal discharge will lighten and decrease in amount gradually.    · Cleanse perineal area from front to back after urination or having a bowel movement.  · Tub soak or portable sitz bath at home.  Apply clean pad with Epifoam and Tucks to perineal area.  · Episiotomy stitches will dissolve within 2-3 weeks  · If not breastfeeding, wear tight fitting sports bra for 1 week--remove only to bathe  · Remember to keep your breast clean and dry to prevent any cracking  · Notify MD if breast become reddened,swollen, nipples bleed or crack, or fever greater than 100.4  · Notify MD of pain, swelling,  Redness, or heat developing in back of leg especially when foot is flexed toward body  · Look at incision everyday for redness, swelling, or drainage which may indicate infection  · Notify MD of pain not relieved by pain medication.  · Call MD or go to ER for any concerns  · Follow up in 6 weeks      After a Vaginal Birth  After having a baby, your body may be very tired. It can take time to recover from a vaginal delivery. You may stay in the hospital or birth center from 1 to 4 days. In some cases, you may be able to go home the same day.    Right after the delivery  Your temperature and blood pressure will be taken until they are stable. A nurse or other healthcare provider will observe you as you rest. You may have afterbirth pains. These are cramps caused by the uterus shrinking. Sanitary pads are used to soak up the discharge of the uterine lining. To make sure that you arent bleeding too much, the pad will be checked. And the firmness of your  uterus will be checked. To do this, a nurse will gently push down on your stomach. If you had anesthesia, youll be watched closely until you can feel and move your toes. If you have perineal pain (pain between the vagina and anus), an ice pack can help.  [place] care  While still in the hospital or birth center, youll learn how to hold and feed your baby. You will also be given instructions on how to care for your baby. This includes bathing and feeding.   Preparing to go home  You may be anxious to go home as soon as possible. Before you and your baby go home, a healthcare provider will check to be sure you are healthy enough to take care of your baby and yourself. Youre ready to go home when:  · You can walk to the bathroom and use the bathroom without help.  · You can eat solid food and swallow pills (if needed).  · You have no sign of infection or other health problems, including fever.   · You have adequate pain control.  · Your bleeding isn't excessive.  · You are able to care for your  and are emotionally stable.  Before leaving the hospital or birth center, youll be given written instructions for home self-care after vaginal delivery. Be sure to follow these instructions carefully. If you have questions or concerns, talk about them now.  If you have stitches  You may have received stitches in the skin near your vagina. The stitches might have closed an episiotomy (an incision that enlarges the opening of the vagina). Or you may have needed stitches to repair torn skin. Either way, your stitches should dissolve within weeks. Until then, you can help reduce discomfort, aid healing, and reduce your risk of infection by keeping the stitches clean. These tips can help:  · Gently wipe from front to back after you urinate or have a bowel movement.  · After wiping, spray warm water on the area. Or you can have a sitz bath. This means sitting in a tub with a few inches of water in it. Then pat the area dry  or use a hairdryer on a cool setting.  · Do not use soap or any solution except water on the area.  · You can take a shower unless told not to.  · Change sanitary pads at least every 2 to 4 hours.  · Place cold or heat packs on the area as directed by your healthcare providers or nurses. Keep a thin towel between the pack and your skin.  · Sit on firm seats so the stitches pull less.   follow-up  Schedule a  follow-up exam with your healthcare provider for about 6 weeks after delivery. During this exam, your uterus and vaginal area will be checked. Contact your healthcare provider if you think you or your baby are having any problems.  When to call your healthcare provider  Call your health care provider right away if you have:  · A fever of 100.4°F (38.0°C) or higher  · Bleeding that requires a new sanitary pad after an hour, or large blood clots  · Pain in your vagina that gets worse and isn't relieved with medicine  · Swelling, discharge, or increased pain from vaginal tear or episiotomy  · Burning, pain, red streaks, or lumpy areas in your breasts that may be accompanied by flu-like symptoms  · Cracks, blisters, or blood on your nipples  · Burning or pain when you urinate  · Nausea or vomiting  · Dizziness or fainting  · Feelings of extreme sadness or anxiety, or a feeling that you dont want to be with your baby  · Abdominal pain that isnt relieved with medicine  · Vaginal discharge that has a bad odor  · No bowel movement for 5 days  · Painful urination, orinability to control urination  · Redness, warmth, or pain in the lower leg  · Chest pain   Date Last Reviewed: 2015  © 8718-1533 [employer]. [address] [place] [address], [place] [address]. All rights reserved. This information is not intended as a substitute for professional medical care. Always follow your healthcare professional's instructions.

## 2017-09-10 NOTE — ASSESSMENT & PLAN NOTE
Obtain prenatal lab work  Postpartum day #1  prenatal labwork's do not appear to have been drawn.  Spoke with the lab will obtain blood draw before patient is discharged home

## 2017-09-10 NOTE — DISCHARGE SUMMARY
[facility]  Obstetrics  Discharge Summary      Patient Name: Nery Brown  MRN: [id number]  Admission Date: 2017  Hospital Length of Stay: 1 days  Discharge Date and Time:  09/10/2017 9:50 AM  Attending Physician: Bashir Sutton MD   Discharging Provider: Bashir Sutton MD  Primary Care Provider: Primary Doctor No    HPI: Patient presents to labor and delivery in the throws of active labor.  Patient states that she is had one prenatal visit although limited records are available.  Patient gives a history of this being her fourth pregnancy with 3 other  deliveries.  Patient reports a due date of 2017.  This would make the patient is 6 weeks.  Patient reports a history of drug abuse but states her last use was a month ago.  Patient appears to be under the influence at present.  Patient presented completely dilated and .    * No surgery found *     Hospital Course:   Patient presented and delivered precipitously.  Placenta delivered without difficulty.  See delivery note  Postpartum day #1 Patient resting comfortably this morning.  Patient's baby was transferred to [facility] and she would like to go and see her baby.  Patient desires to smoke.  Patient has not seen  this hospitalization however she has seen  with a previous pregnancy and will follow-up at [facility].    Consults         Status Ordering Provider     Inpatient consult to Social Work  Once     Provider:  (Not yet assigned)    Acknowledged BASHIR SUTTON          Final Active Diagnoses:    Diagnosis Date Noted POA    PRINCIPAL PROBLEM:   labor in third trimester with  delivery [O60.14X0] 2017 Yes    No prenatal care in current pregnancy in third trimester [O09.33] 2017 Not Applicable    History of drug abuse [Z87.898] 2017 Yes    History of  delivery, currently pregnant in third trimester [O09.213] 2017 Not Applicable       Problems Resolved During this Admission:    Diagnosis Date Noted Date Resolved POA            Feeding Method: bottle    Immunizations     Date Immunization Status Dose Route/Site Given by    09/09/17 1231 MMR Incomplete 0.5 mL Subcutaneous/Left deltoid     09/09/17 1425 Tdap Deleted 0.5 mL Intramuscular/Left deltoid     09/09/17 1134 Rho (D) Immune Globulin - IM Incomplete 300 mcg Intramuscular/     09/09/17 1525 Tdap Incomplete 0.5 mL Intramuscular/Left deltoid           Delivery:    Episiotomy: None   Lacerations: None   Repair suture: None   Repair # of packets: 0   Blood loss (ml): 50     Birth information:  YOB: 2017   Time of birth: 11:03 AM   Sex: male   Delivery type: Vaginal, Spontaneous Delivery   Gestational Age: 33w1d    Delivery Clinician:      Other providers:       Additional  information:  Forceps:    Vacuum:    Breech:    Observed anomalies      Living?:           APGARS  One minute Five minutes Ten minutes   Skin color:         Heart rate:         Grimace:         Muscle tone:         Breathing:         Totals: 7  8        Placenta: Delivered:       appearance    Pending Diagnostic Studies:     Procedure Component Value Units Date/Time    RPR [[id number]] Collected:  09/09/17 1131    Order Status:  Sent Lab Status:  In process Updated:  09/09/17 1135    Specimen:  Blood from Blood           Discharged Condition: good    Disposition:     Follow Up:    Patient Instructions:     Cystic Fibrosis Mutation Panel   Standing Status: Future  Standing Exp. Date: 11/08/18     Hepatitis B surface antigen   Standing Status: Future  Standing Exp. Date: 11/08/18     HIV-1 and HIV-2 antibodies   Standing Status: Future  Standing Exp. Date: 11/08/18     Rubella antibody, IgG   Standing Status: Future  Standing Exp. Date: 11/08/18     TSH   Standing Status: Future  Standing Exp. Date: 11/08/18       Medications:  Current Discharge Medication List      CONTINUE these medications which have NOT CHANGED     Details   PNV,calcium 72-iron-folic acid (PRENATAL PLUS, CALCIUM CARB,) 27 mg iron- 1 mg Tab Take 1 tablet (1 each total) by mouth once daily.  Qty: 30 tablet, Refills: 11    Comments: OK to substitute comparable prenatal vitamin covered by pt's insurance  Associated Diagnoses: Encounter for supervision of other normal pregnancy in first trimester      PRENATAL VIT/IRON FUMARATE/FA (PRENATAL 1+1 ORAL) Take by mouth.             Bashir Sutton MD  Obstetrics  [facility]

## 2017-09-10 NOTE — ASSESSMENT & PLAN NOTE
Will obtain urine drug screen  Drug screen positive for amphetamines.  Patient is not on a treatment program.  Patient will follow-up with  at [facility]

## 2017-09-10 NOTE — PLAN OF CARE
Problem: Patient Care Overview  Goal: Plan of Care Review  Pt in stable condition. Pain well controlled with po meds. Fundus firm, lochia light. Pt expressing she would like to go so she can see her baby at [facility]. VS stable. Pt refuses tdap and mmr vaccines, also refusing rhogam shot. Pt v/u of importance of all vaccines. Discussed post partum follow up, pt wishes to have tubes tied. Reinforced to notify physician when she goes for follow up of wishes for BTL. V/u. Pt tolerating diet well. Waiting for parent meal to discharge home.

## 2017-09-10 NOTE — NURSING
Pt discharged from hospital via wheelchair, belongings with patient. Offered baby box information before discharge, pt refused. Pt leaving with family member.

## 2017-09-10 NOTE — PLAN OF CARE
Problem: Patient Care Overview  Goal: Individualization & Mutuality  1. Expecting baby boy  2. Plans to formula feed     Stable shift.  Pt given pain medication upon request.  Eating well. Voiding well without difficulty.  Perineal care done per pt without assistance.  Pt rested in bed sleeping much of night without complaints.

## 2017-09-10 NOTE — PLAN OF CARE
Problem: Patient Care Overview  Goal: Individualization & Mutuality  1. Expecting baby boy  2. Plans to formula feed       33 wks  Vaginal delivery with intact perineum.    Baby transferred to [facility]

## 2017-09-11 LAB
HBV SURFACE AG SERPL QL IA: NEGATIVE
HIV 1+2 AB+HIV1 P24 AG SERPL QL IA: NEGATIVE
RPR SER QL: NORMAL
RUBV IGG SER-ACNC: 7.7 IU/ML
RUBV IGG SER-IMP: ABNORMAL

## 2017-09-11 NOTE — PHYSICIAN QUERY
PT Name: Nery Brown  MR #: [id number]     Physician Query Form - Documentation Clarification      CDS/: VIDYA Mena, RN, CDI               Contact information: [phone]    This form is a permanent document in the medical record.     Query Date: September 11, 2017    By submitting this query, we are merely seeking further clarification of documentation. Please utilize your independent clinical judgment when addressing the question(s) below.    The Medical record reflects the following:    Supporting Clinical Findings Location in Medical Record      reports a history of drug abuse but states her last use was a month ago    appears to be under the influence at present     Will obtain urine drug screen     Amphetamine screening: presumptive positive     H & P: 9/9                Lab: 9/9                                                                                      Doctor Herman, Please document if there are any additional diagnosis or diagnoses associated with above clinical findings.    Provider Use Only        Substance abuse as noted by positive amphetamine screen                                                                                                                       [  ] Clinically undetermined

## 2017-09-13 LAB — CFTR MUT ANL BLD/T: NORMAL

## 2018-07-10 ENCOUNTER — HOSPITAL ENCOUNTER (EMERGENCY)
Facility: OTHER | Age: 33
Discharge: HOME | End: 2018-07-10
Attending: EMERGENCY MEDICINE
Payer: MEDICAID

## 2018-07-10 VITALS
HEIGHT: 66 IN | RESPIRATION RATE: 18 BRPM | BODY MASS INDEX: 18.16 KG/M2 | WEIGHT: 113 LBS | SYSTOLIC BLOOD PRESSURE: 101 MMHG | DIASTOLIC BLOOD PRESSURE: 72 MMHG | TEMPERATURE: 98 F | HEART RATE: 64 BPM | OXYGEN SATURATION: 100 %

## 2018-07-10 DIAGNOSIS — R07.9 CHEST PAIN: Primary | ICD-10-CM

## 2018-07-10 LAB
ALBUMIN SERPL BCP-MCNC: 3.9 G/DL
ALP SERPL-CCNC: 72 U/L
ALT SERPL W/O P-5'-P-CCNC: 95 U/L
ANION GAP SERPL CALC-SCNC: 7 MMOL/L
AST SERPL-CCNC: 55 U/L
B-HCG UR QL: NEGATIVE
BASOPHILS # BLD AUTO: 0.04 K/UL
BASOPHILS NFR BLD: 0.4 %
BILIRUB SERPL-MCNC: 0.2 MG/DL
BUN SERPL-MCNC: 17 MG/DL
CALCIUM SERPL-MCNC: 9.6 MG/DL
CHLORIDE SERPL-SCNC: 106 MMOL/L
CO2 SERPL-SCNC: 27 MMOL/L
CREAT SERPL-MCNC: 0.7 MG/DL
CTP QC/QA: YES
DIFFERENTIAL METHOD: ABNORMAL
EOSINOPHIL # BLD AUTO: 0.3 K/UL
EOSINOPHIL NFR BLD: 3.1 %
ERYTHROCYTE [DISTWIDTH] IN BLOOD BY AUTOMATED COUNT: 17.1 %
EST. GFR  (AFRICAN AMERICAN): >60 ML/MIN/1.73 M^2
EST. GFR  (NON AFRICAN AMERICAN): >60 ML/MIN/1.73 M^2
GLUCOSE SERPL-MCNC: 77 MG/DL
HCT VFR BLD AUTO: 35.8 %
HGB BLD-MCNC: 11.5 G/DL
LYMPHOCYTES # BLD AUTO: 2.1 K/UL
LYMPHOCYTES NFR BLD: 21.2 %
MCH RBC QN AUTO: 29.8 PG
MCHC RBC AUTO-ENTMCNC: 32.1 G/DL
MCV RBC AUTO: 93 FL
MONOCYTES # BLD AUTO: 0.7 K/UL
MONOCYTES NFR BLD: 6.8 %
NEUTROPHILS # BLD AUTO: 6.7 K/UL
NEUTROPHILS NFR BLD: 68.2 %
PLATELET # BLD AUTO: 350 K/UL
PMV BLD AUTO: 9.8 FL
POTASSIUM SERPL-SCNC: 4.4 MMOL/L
PROT SERPL-MCNC: 7 G/DL
RBC # BLD AUTO: 3.86 M/UL
SODIUM SERPL-SCNC: 140 MMOL/L
TROPONIN I SERPL DL<=0.01 NG/ML-MCNC: <0.006 NG/ML
WBC # BLD AUTO: 9.75 K/UL

## 2018-07-10 PROCEDURE — 80053 COMPREHEN METABOLIC PANEL: CPT

## 2018-07-10 PROCEDURE — 85025 COMPLETE CBC W/AUTO DIFF WBC: CPT

## 2018-07-10 PROCEDURE — 81025 URINE PREGNANCY TEST: CPT | Performed by: EMERGENCY MEDICINE

## 2018-07-10 PROCEDURE — 96361 HYDRATE IV INFUSION ADD-ON: CPT

## 2018-07-10 PROCEDURE — 96374 THER/PROPH/DIAG INJ IV PUSH: CPT

## 2018-07-10 PROCEDURE — 63600175 PHARM REV CODE 636 W HCPCS: Performed by: EMERGENCY MEDICINE

## 2018-07-10 PROCEDURE — 84484 ASSAY OF TROPONIN QUANT: CPT

## 2018-07-10 PROCEDURE — 99284 EMERGENCY DEPT VISIT MOD MDM: CPT | Mod: 25

## 2018-07-10 PROCEDURE — 93005 ELECTROCARDIOGRAM TRACING: CPT

## 2018-07-10 PROCEDURE — 93010 ELECTROCARDIOGRAM REPORT: CPT | Mod: ,,, | Performed by: INTERNAL MEDICINE

## 2018-07-10 PROCEDURE — 25000003 PHARM REV CODE 250: Performed by: EMERGENCY MEDICINE

## 2018-07-10 RX ORDER — KETOROLAC TROMETHAMINE 30 MG/ML
30 INJECTION, SOLUTION INTRAMUSCULAR; INTRAVENOUS
Status: COMPLETED | OUTPATIENT
Start: 2018-07-10 | End: 2018-07-10

## 2018-07-10 RX ADMIN — KETOROLAC TROMETHAMINE 30 MG: 30 INJECTION, SOLUTION INTRAMUSCULAR at 10:07

## 2018-07-10 RX ADMIN — SODIUM CHLORIDE 1000 ML: 0.9 INJECTION, SOLUTION INTRAVENOUS at 10:07

## 2018-07-10 NOTE — ED NOTES
"ROUNDING:  Reclining in chair; AAOx4.Pt is calm and cooperative. C/o mid sternal chest pain 8/10 "it feels like someone kicked me." States pain has been constant. Pain is reproducible with touch. Airway patent. Denies sob, dizziness, lightheadedness, numbness, tingling, n/v or any other discomfort at this time. Skin is warm and dry. Resp:18 even and unlabored. Comfort and BR needs addressed. Plan of care updated. NADN. Recliner wheels locked and call light within reach. Will continue to monitor    "

## 2018-07-10 NOTE — DISCHARGE INSTRUCTIONS
Please return to the ER if you have worsening chest pain, difficulty breathing, fevers, altered mental status, dizziness, weakness, or any other concerns.      Follow up with your primary care physician.

## 2018-07-10 NOTE — ED PROVIDER NOTES
"Encounter Date: 7/10/2018    SCRIBE #1 NOTE: I, Dillan Hammond, am scribing for, and in the presence of, Dr. Elise .       History     Chief Complaint   Patient presents with    Chest Pain     Pt arrived by EMS coming from [facility] for eval of substernal chest pain that started 40 minutes ago. Last meth use 1 week ago. Denies SOB. +nausea, + hot flashes.      Time seen by provider: 10:03 AM    This is a 33 y.o. Female, with history of depression and anxiety, who presents via EMS with complaint of sudden, mid sternal, chest pain that began this morning. Patient was laying in bed during onset and states "it feels like someone is punching me in the chest". She has never experienced chest pain before. She notes EMS did not give her medications on scene or en route. She admits nausea earlier today but states it has resolved. Patient has been experiencing congestion, but denies fevers, chills, headaches, dizziness, rhinorrhea, cough, SOB, abdominal pain, vomiting, diarrhea, or urinary symptoms. 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 NKDA.                 The history is provided by the patient.     Review of patient's allergies indicates:  No Known Allergies  Past Medical History:   Diagnosis Date    Anxiety     Depression     Drug abuse      History reviewed. No pertinent surgical history.  Family History   Problem Relation Age of Onset    Ovarian cancer Maternal Grandmother     Hypertension Father     Stroke Father     Breast cancer Sister      Social History   Substance Use Topics    Smoking status: Current Every Day Smoker     Packs/day: 0.50     Types: Cigarettes    Smokeless tobacco: Never Used    Alcohol use No     Review of Systems   Constitutional: Negative for chills and fever.   HENT: Positive for congestion. Negative for rhinorrhea and sore throat.    Respiratory: Negative for cough and shortness of breath.    Cardiovascular: " Positive for chest pain.   Gastrointestinal: Positive for nausea (resolved). Negative for abdominal pain, diarrhea and vomiting.   Genitourinary: Negative for decreased urine volume, difficulty urinating, dysuria, frequency and urgency.   Musculoskeletal: Negative for back pain.   Skin: Negative for rash.   Neurological: Negative for dizziness, weakness and headaches.   Psychiatric/Behavioral: Negative for confusion.       Physical Exam     Initial Vitals [07/10/18 0942]   BP Pulse Resp Temp SpO2   (!) 99/58 73 16 98 °F (36.7 °C) 98 %      MAP       --         Physical Exam    Nursing note and vitals reviewed.  Constitutional: She appears well-developed and well-nourished. No distress.   HENT:   Head: Normocephalic and atraumatic.   Eyes: Conjunctivae and EOM are normal.   Neck: Normal range of motion. Neck supple.   Cardiovascular: Normal rate, regular rhythm and normal heart sounds.   Pulmonary/Chest: Breath sounds normal. No respiratory distress. She has no wheezes. She has no rales. She exhibits tenderness.   Mid sternal chest wall tenderness present.    Abdominal: Soft. Bowel sounds are normal. She exhibits no distension. There is no tenderness. There is no rebound.   Musculoskeletal: Normal range of motion.   Neurological: She is alert and oriented to person, place, and time.   Skin: Skin is warm and dry. No rash noted.   Psychiatric: She has a normal mood and affect. Her behavior is normal. Judgment and thought content normal.         ED Course   Procedures  Labs Reviewed   COMPREHENSIVE METABOLIC PANEL - Abnormal; Notable for the following:        Result Value    AST 55 (*)     ALT 95 (*)     Anion Gap 7 (*)     All other components within normal limits   CBC W/ AUTO DIFFERENTIAL - Abnormal; Notable for the following:     RBC 3.86 (*)     Hemoglobin 11.5 (*)     Hematocrit 35.8 (*)     RDW 17.1 (*)     All other components within normal limits   TROPONIN I   POCT URINE PREGNANCY     EKG Readings:  (Independently Interpreted)   Initial Reading: No STEMI.   9:51 NSR at a rate of 68. Normal axis. Normal intervals. No ST or ischemic changes.        Imaging Results          X-Ray Chest PA And Lateral (Final result)  Result time 07/10/18 10:55:39    Final result by Oscar Poole MD (07/10/18 10:55:39)                 Impression:      Normal chest      Electronically signed by: Oscar Poole MD  Date:    07/10/2018  Time:    10:55             Narrative:    EXAMINATION:  XR CHEST PA AND LATERAL    CLINICAL HISTORY:  Chest pain, unspecified    TECHNIQUE:  PA and lateral views of the chest were performed.    COMPARISON:  None    FINDINGS:  The heart size and pulmonary vessels are normal.  Lungs are expanded and clear.  No lung consolidation or pleural fluid is identified.  The skeletal structures are intact.                                  X-Rays:   Independently Interpreted Readings:   Chest X-Ray: Trachea midline. No cardiomegaly. No effusion, edema or pneumothorax.        Medical Decision Making:   History:   Old Medical Records: I decided to obtain old medical records.  Old Records Summarized: other records and records from clinic visits.  Initial Assessment:   10:03AM:  Pt is a 34 y/o F who presents to ED with chest pain. Pt appears well, nontoxic.  She does have mid sternal chest wall tenderness.  Will plan for labs, CXR, cardiac eval, will continue to follow and reassess.    Independently Interpreted Test(s):   I have ordered and independently interpreted X-rays - see prior notes.  I have ordered and independently interpreted EKG Reading(s) - see prior notes  Clinical Tests:   Lab Tests: Ordered and Reviewed  Radiological Study: Ordered and Reviewed  Medical Tests: Ordered and Reviewed    12:04PM:  Pt doing well. She is feeling better and has fallen asleep. Her labs are unremarkable.  I have a low suspicion that her pain is cardiac in nature.   I updated pt regarding results and I counseled pt  regarding supportive care measures.  I have discussed with the pt ED return warnings and need for close PCP f/u.  Pt agreeable to plan and all questions answered.  I feel that pt is stable for discharge and management as an outpatient and no further intervention is needed at this time.  Pt is comfortable returning to the ED if needed.  Will DC home in stable condition.                Scribe Attestation:   Scribe #1: I performed the above scribed service and the documentation accurately describes the services I performed. I attest to the accuracy of the note.    Attending Attestation:           Physician Attestation for Scribe:  Physician Attestation Statement for Scribe #1: I, Dr. Elise, reviewed documentation, as scribed by Dillan Hammond  in my presence, and it is both accurate and complete.                    Clinical Impression:     1. Chest pain                                   Ev Elise MD  07/10/18 9921

## 2018-12-10 PROBLEM — L03.211 CELLULITIS OF FACE: Status: ACTIVE | Noted: 2018-12-10

## 2020-01-12 ENCOUNTER — HOSPITAL ENCOUNTER (INPATIENT)
Facility: HOSPITAL | Age: 35
LOS: 1 days | Discharge: HOME | End: 2020-01-13
Attending: OBSTETRICS & GYNECOLOGY | Admitting: OBSTETRICS & GYNECOLOGY

## 2020-01-12 ENCOUNTER — ANESTHESIA EVENT (OUTPATIENT)
Dept: OBSTETRICS AND GYNECOLOGY | Facility: HOSPITAL | Age: 35
End: 2020-01-12

## 2020-01-12 ENCOUNTER — ANESTHESIA (OUTPATIENT)
Dept: OBSTETRICS AND GYNECOLOGY | Facility: HOSPITAL | Age: 35
End: 2020-01-12

## 2020-01-12 DIAGNOSIS — O47.9 THREATENED LABOR AT TERM: ICD-10-CM

## 2020-01-12 LAB
ABO + RH BLD: NORMAL
ABO + RH BLD: NORMAL
AMPHET+METHAMPHET UR QL: NORMAL
BARBITURATES UR QL SCN>200 NG/ML: NEGATIVE
BASOPHILS # BLD AUTO: 0.06 K/UL (ref 0–0.2)
BASOPHILS NFR BLD: 0.5 % (ref 0–1.9)
BENZODIAZ UR QL SCN>200 NG/ML: NEGATIVE
BLD GP AB SCN CELLS X3 SERPL QL: NORMAL
BLD GP AB SCN CELLS X3 SERPL QL: NORMAL
BZE UR QL SCN: NEGATIVE
CANNABINOIDS UR QL SCN: NEGATIVE
CREAT UR-MCNC: 134.7 MG/DL (ref 15–325)
DIFFERENTIAL METHOD: ABNORMAL
EOSINOPHIL # BLD AUTO: 0.3 K/UL (ref 0–0.5)
EOSINOPHIL NFR BLD: 2.3 % (ref 0–8)
ERYTHROCYTE [DISTWIDTH] IN BLOOD BY AUTOMATED COUNT: 14 % (ref 11.5–14.5)
FETAL CELL SCN BLD QL ROSETTE: NORMAL
HCT VFR BLD AUTO: 29.5 % (ref 37–48.5)
HGB BLD-MCNC: 9.2 G/DL (ref 12–16)
HIV1+2 IGG SERPL QL IA.RAPID: NEGATIVE
IMM GRANULOCYTES # BLD AUTO: 0.24 K/UL (ref 0–0.04)
IMM GRANULOCYTES NFR BLD AUTO: 1.9 % (ref 0–0.5)
INJECT RH IG VOL PATIENT: NORMAL ML
LYMPHOCYTES # BLD AUTO: 2.6 K/UL (ref 1–4.8)
LYMPHOCYTES NFR BLD: 20.7 % (ref 18–48)
MCH RBC QN AUTO: 29 PG (ref 27–31)
MCHC RBC AUTO-ENTMCNC: 31.2 G/DL (ref 32–36)
MCV RBC AUTO: 93 FL (ref 82–98)
METHADONE UR QL SCN>300 NG/ML: NEGATIVE
MONOCYTES # BLD AUTO: 1 K/UL (ref 0.3–1)
MONOCYTES NFR BLD: 8.1 % (ref 4–15)
NEUTROPHILS # BLD AUTO: 8.3 K/UL (ref 1.8–7.7)
NEUTROPHILS NFR BLD: 66.5 % (ref 38–73)
NRBC BLD-RTO: 0 /100 WBC
OPIATES UR QL SCN: NEGATIVE
PCP UR QL SCN>25 NG/ML: NEGATIVE
PLATELET # BLD AUTO: 526 K/UL (ref 150–350)
PMV BLD AUTO: 10.1 FL (ref 9.2–12.9)
RBC # BLD AUTO: 3.17 M/UL (ref 4–5.4)
TOXICOLOGY INFORMATION: NORMAL
WBC # BLD AUTO: 12.44 K/UL (ref 3.9–12.7)

## 2020-01-12 PROCEDURE — 87340 HEPATITIS B SURFACE AG IA: CPT

## 2020-01-12 PROCEDURE — 63600175 PHARM REV CODE 636 W HCPCS: Performed by: OBSTETRICS & GYNECOLOGY

## 2020-01-12 PROCEDURE — 86592 SYPHILIS TEST NON-TREP QUAL: CPT

## 2020-01-12 PROCEDURE — 59410 OBSTETRICAL CARE: CPT | Mod: ,,, | Performed by: OBSTETRICS & GYNECOLOGY

## 2020-01-12 PROCEDURE — 86762 RUBELLA ANTIBODY: CPT

## 2020-01-12 PROCEDURE — 11000001 HC ACUTE MED/SURG PRIVATE ROOM

## 2020-01-12 PROCEDURE — 63600519 RHOGAM PHARM REV CODE 636 ALT 250 W HCPCS: Performed by: OBSTETRICS & GYNECOLOGY

## 2020-01-12 PROCEDURE — 85461 HEMOGLOBIN FETAL: CPT

## 2020-01-12 PROCEDURE — 85025 COMPLETE CBC W/AUTO DIFF WBC: CPT

## 2020-01-12 PROCEDURE — 80307 DRUG TEST PRSMV CHEM ANLYZR: CPT

## 2020-01-12 PROCEDURE — 36415 COLL VENOUS BLD VENIPUNCTURE: CPT

## 2020-01-12 PROCEDURE — 86701 HIV-1ANTIBODY: CPT

## 2020-01-12 PROCEDURE — S4991 NICOTINE PATCH NONLEGEND: HCPCS | Performed by: OBSTETRICS & GYNECOLOGY

## 2020-01-12 PROCEDURE — 86901 BLOOD TYPING SEROLOGIC RH(D): CPT

## 2020-01-12 PROCEDURE — 72200004 HC VAGINAL DELIVERY LEVEL I

## 2020-01-12 PROCEDURE — 25000003 PHARM REV CODE 250: Performed by: OBSTETRICS & GYNECOLOGY

## 2020-01-12 PROCEDURE — 72100002 HC LABOR CARE, 1ST 8 HOURS

## 2020-01-12 PROCEDURE — 59410 PR OBSTE CARE,VAG DELIV+POSTPARTUM: ICD-10-PCS | Mod: ,,, | Performed by: OBSTETRICS & GYNECOLOGY

## 2020-01-12 PROCEDURE — 86901 BLOOD TYPING SEROLOGIC RH(D): CPT | Mod: 91

## 2020-01-12 RX ORDER — OXYTOCIN/RINGER'S LACTATE 30/500 ML
2 PLASTIC BAG, INJECTION (ML) INTRAVENOUS CONTINUOUS
Status: DISCONTINUED | OUTPATIENT
Start: 2020-01-12 | End: 2020-01-12

## 2020-01-12 RX ORDER — PRENATAL WITH FERROUS FUM AND FOLIC ACID 3080; 920; 120; 400; 22; 1.84; 3; 20; 10; 1; 12; 200; 27; 25; 2 [IU]/1; [IU]/1; MG/1; [IU]/1; MG/1; MG/1; MG/1; MG/1; MG/1; MG/1; UG/1; MG/1; MG/1; MG/1; MG/1
1 TABLET ORAL DAILY
Status: DISCONTINUED | OUTPATIENT
Start: 2020-01-12 | End: 2020-01-13 | Stop reason: HOSPADM

## 2020-01-12 RX ORDER — SODIUM CHLORIDE 9 MG/ML
INJECTION, SOLUTION INTRAVENOUS
Status: DISCONTINUED | OUTPATIENT
Start: 2020-01-12 | End: 2020-01-12

## 2020-01-12 RX ORDER — DIPHENHYDRAMINE HYDROCHLORIDE 50 MG/ML
25 INJECTION INTRAMUSCULAR; INTRAVENOUS EVERY 4 HOURS PRN
Status: DISCONTINUED | OUTPATIENT
Start: 2020-01-12 | End: 2020-01-13 | Stop reason: HOSPADM

## 2020-01-12 RX ORDER — ONDANSETRON 4 MG/1
8 TABLET, ORALLY DISINTEGRATING ORAL EVERY 8 HOURS PRN
Status: DISCONTINUED | OUTPATIENT
Start: 2020-01-12 | End: 2020-01-12

## 2020-01-12 RX ORDER — SIMETHICONE 80 MG
1 TABLET,CHEWABLE ORAL EVERY 6 HOURS PRN
Status: DISCONTINUED | OUTPATIENT
Start: 2020-01-12 | End: 2020-01-13 | Stop reason: HOSPADM

## 2020-01-12 RX ORDER — BUTORPHANOL TARTRATE 2 MG/ML
2 INJECTION INTRAMUSCULAR; INTRAVENOUS
Status: DISCONTINUED | OUTPATIENT
Start: 2020-01-12 | End: 2020-01-12

## 2020-01-12 RX ORDER — DOCUSATE SODIUM 100 MG/1
200 CAPSULE, LIQUID FILLED ORAL 2 TIMES DAILY PRN
Status: DISCONTINUED | OUTPATIENT
Start: 2020-01-12 | End: 2020-01-13 | Stop reason: HOSPADM

## 2020-01-12 RX ORDER — IBUPROFEN 200 MG
1 TABLET ORAL DAILY
Status: DISCONTINUED | OUTPATIENT
Start: 2020-01-12 | End: 2020-01-13 | Stop reason: HOSPADM

## 2020-01-12 RX ORDER — KETOROLAC TROMETHAMINE 30 MG/ML
30 INJECTION, SOLUTION INTRAMUSCULAR; INTRAVENOUS EVERY 8 HOURS
Status: COMPLETED | OUTPATIENT
Start: 2020-01-12 | End: 2020-01-13

## 2020-01-12 RX ORDER — HYDROCORTISONE 25 MG/G
CREAM TOPICAL 3 TIMES DAILY PRN
Status: DISCONTINUED | OUTPATIENT
Start: 2020-01-12 | End: 2020-01-13 | Stop reason: HOSPADM

## 2020-01-12 RX ORDER — CALCIUM CARBONATE 200(500)MG
500 TABLET,CHEWABLE ORAL 3 TIMES DAILY PRN
Status: DISCONTINUED | OUTPATIENT
Start: 2020-01-12 | End: 2020-01-12

## 2020-01-12 RX ORDER — ACETAMINOPHEN 325 MG/1
650 TABLET ORAL EVERY 6 HOURS PRN
Status: DISCONTINUED | OUTPATIENT
Start: 2020-01-12 | End: 2020-01-13 | Stop reason: HOSPADM

## 2020-01-12 RX ORDER — SIMETHICONE 80 MG
1 TABLET,CHEWABLE ORAL 4 TIMES DAILY PRN
Status: DISCONTINUED | OUTPATIENT
Start: 2020-01-12 | End: 2020-01-12

## 2020-01-12 RX ORDER — DIPHENHYDRAMINE HCL 25 MG
25 CAPSULE ORAL EVERY 4 HOURS PRN
Status: DISCONTINUED | OUTPATIENT
Start: 2020-01-12 | End: 2020-01-13 | Stop reason: HOSPADM

## 2020-01-12 RX ORDER — IBUPROFEN 800 MG/1
800 TABLET ORAL EVERY 8 HOURS
Status: DISCONTINUED | OUTPATIENT
Start: 2020-01-13 | End: 2020-01-13 | Stop reason: HOSPADM

## 2020-01-12 RX ORDER — ONDANSETRON 2 MG/ML
4 INJECTION INTRAMUSCULAR; INTRAVENOUS EVERY 8 HOURS PRN
Status: DISCONTINUED | OUTPATIENT
Start: 2020-01-12 | End: 2020-01-13 | Stop reason: HOSPADM

## 2020-01-12 RX ORDER — OXYTOCIN/RINGER'S LACTATE 30/500 ML
95 PLASTIC BAG, INJECTION (ML) INTRAVENOUS ONCE
Status: DISCONTINUED | OUTPATIENT
Start: 2020-01-12 | End: 2020-01-12

## 2020-01-12 RX ORDER — OXYTOCIN/RINGER'S LACTATE 30/500 ML
95 PLASTIC BAG, INJECTION (ML) INTRAVENOUS ONCE
Status: DISCONTINUED | OUTPATIENT
Start: 2020-01-12 | End: 2020-01-13 | Stop reason: HOSPADM

## 2020-01-12 RX ORDER — OXYCODONE AND ACETAMINOPHEN 5; 325 MG/1; MG/1
1 TABLET ORAL EVERY 4 HOURS PRN
Status: DISCONTINUED | OUTPATIENT
Start: 2020-01-12 | End: 2020-01-13 | Stop reason: HOSPADM

## 2020-01-12 RX ORDER — SODIUM CHLORIDE, SODIUM LACTATE, POTASSIUM CHLORIDE, CALCIUM CHLORIDE 600; 310; 30; 20 MG/100ML; MG/100ML; MG/100ML; MG/100ML
INJECTION, SOLUTION INTRAVENOUS CONTINUOUS
Status: DISCONTINUED | OUTPATIENT
Start: 2020-01-12 | End: 2020-01-12

## 2020-01-12 RX ORDER — OXYTOCIN/RINGER'S LACTATE 30/500 ML
334 PLASTIC BAG, INJECTION (ML) INTRAVENOUS ONCE
Status: COMPLETED | OUTPATIENT
Start: 2020-01-12 | End: 2020-01-12

## 2020-01-12 RX ADMIN — DEXTROSE 5 MILLION UNITS: 50 INJECTION, SOLUTION INTRAVENOUS at 11:01

## 2020-01-12 RX ADMIN — HUMAN RHO(D) IMMUNE GLOBULIN 300 MCG: 300 INJECTION, SOLUTION INTRAMUSCULAR at 09:01

## 2020-01-12 RX ADMIN — KETOROLAC TROMETHAMINE 30 MG: 30 INJECTION, SOLUTION INTRAMUSCULAR at 09:01

## 2020-01-12 RX ADMIN — NICOTINE 1 PATCH: 21 PATCH TRANSDERMAL at 08:01

## 2020-01-12 RX ADMIN — Medication 334 MILLI-UNITS/MIN: at 01:01

## 2020-01-12 RX ADMIN — SODIUM CHLORIDE, POTASSIUM CHLORIDE, SODIUM LACTATE AND CALCIUM CHLORIDE: 600; 310; 30; 20 INJECTION, SOLUTION INTRAVENOUS at 11:01

## 2020-01-12 RX ADMIN — KETOROLAC TROMETHAMINE 30 MG: 30 INJECTION, SOLUTION INTRAMUSCULAR at 02:01

## 2020-01-12 NOTE — NURSING
Pt. Refuses to get up to bathroom until ivf are complete, uterus firm @ 2 below umbilicus, light bleeding; bladder non distended;

## 2020-01-12 NOTE — L&D DELIVERY NOTE
[facility]  Vaginal Delivery   Operative Note    SUMMARY     Normal spontaneous vaginal delivery of live infant, was placed on mothers abdomen for skin to skin and bulb suctioning performed.  Infant delivered position FORD over intact perineum.  Nuchal cord: No.    Spontaneous delivery of placenta and IV pitocin given noting good uterine tone.  No lacerations noted.  Patient tolerated delivery well. Sponge needle and lap counted correctly x2.    Indications: Normal labor and delivery  Pregnancy complicated by:   Patient Active Problem List   Diagnosis     delivered after precipitous labor    No prenatal care in current pregnancy    Labor, precipitous, delivered    No prenatal care in current pregnancy in third trimester    History of drug abuse    History of  delivery, currently pregnant in third trimester    Cellulitis of face    Threatened labor at term    Normal labor and delivery     Admitting GA: Unknown    Delivery Information for Sarah Brown    Birth information:  YOB: 2020   Time of birth: 1:32 PM   Sex: female   Head Delivery Date/Time: 2020  1:30 PM   Delivery type: Vaginal, Spontaneous   Gestational Age: <None>    Delivery Providers    Delivering clinician:  Bashir Sutton MD   Provider Role    Dyan Kelley RN Delivery Nurse    Maru Rhoades, [facility]    Tamiko Ford RN Registered Nurse    Irma Boles RN Registered Nurse            Measurements    Weight:  3204 g  Length:  48.3 cm  Head circumference:  33.7 cm  Chest circumference:  33 cm  Abdominal girth:  33 cm  Birth comments:  moderate meconium stained skin          Apgars    Living status:  Living  Apgars:   1 min.:   5 min.:   10 min.:   15 min.:   20 min.:     Skin color:   0  1       Heart rate:   2  2       Reflex irritability:   2  2       Muscle tone:   2  2       Respiratory effort:   2  2       Total:   8  9       Apgars assigned by:  KORTNEY FORD RN          Operative Delivery    Forceps attempted?:  No  Vacuum extractor attempted?:  No         Shoulder Dystocia    Shoulder dystocia present?:  No           Presentation    Presentation:  Vertex  Position:  Left Occiput Anterior           Interventions/Resuscitation    Method:  Bulb Suctioning, Tactile Stimulation       Cord    Vessels:  3 vessels  Complications:  None  Delayed Cord Clamping?:  Yes  Cord Clamped Date/Time:  2020  2:38 PM  Cord Blood Disposition:  Lab  Gases Sent?:  No  Stem Cell Collection (by MD):  No       Placenta    Placenta delivery date/time:  2020 1322  Placenta removal:  Spontaneous  Placenta appearance:  Intact  Placenta disposition:  discarded           Labor Events:       labor: No     Labor Onset Date/Time: 2020 00:00     Dilation Complete Date/Time: 2020       Start Pushing Date/Time: 2020 12:27       Start Pushing Date/Time: 2020 12:27     Rupture Date/Time: 20  1235         Rupture type:           Fluid Amount:        Fluid Color: Tinted      Fluid Odor:        Membrane Status: ARM (Artificial Rupture)               steroids: None     Antibiotics given for GBS: Yes     Induction: none     Indications for induction:        Augmentation:       Indications for augmentation:       Labor complications: None     Additional complications:          Cervical ripening:                     Delivery:      Episiotomy: None     Indication for Episiotomy:       Perineal Lacerations: None Repaired:      Periurethral Laceration:   Repaired:     Labial Laceration:   Repaired:     Sulcus Laceration:   Repaired:     Vaginal Laceration:   Repaired:     Cervical Laceration:   Repaired:     Repair suture: None     Repair # of packets:       Last Value - EBL - Nursing (mL):       Sum - EBL - Nursing (mL): 0     Last Value - EBL - Anesthesia (mL):      Calculated QBL (mL): 53      Vaginal Sweep Performed: Yes     Surgicount Correct: Yes       Other  providers:       Anesthesia    Method:  None          Details (if applicable):  Trial of Labor      Categorization:      Priority:     Indications for :     Incision Type:       Additional  information:  Forceps:    Vacuum:    Breech:    Observed anomalies moderate meconium stained skin    Other (Comments): Initial ID band # [id number] in delivery. ID bands changed to #[id number] at 2100 due to infant admit incorrectly as A Girl Nery Stephanie.

## 2020-01-12 NOTE — PLAN OF CARE
Pt. 37.4 wk. Gestation, normal spontaneous vag.delivery of viable female @ 1332; uterus firm, 2 below umbilicus, light lochia, tolerating reg. Diet. Reports 3/10 pain scale.

## 2020-01-12 NOTE — SUBJECTIVE & OBJECTIVE
This pregnancy has been complicated by no recent prenatal care    OB History    Para Term  AB Living   7 5 2 2 1 5   SAB TAB Ectopic Multiple Live Births   0 0 0 0 2      # Outcome Date GA Lbr Harshal/2nd Weight Sex Delivery Anes PTL Lv   7 Current            6 Para 17   2.336 kg (5 lb 2.4 oz) M Vag-Spont None  ALLEN      Complications: Precipitate labor, Precipitous delivery      Name: NORTH NELSON      Apgar1: 7  Apgar5: 8   5  16   2.676 kg (5 lb 14.4 oz) F Vag-Spont None  ALLEN      Complications: Precipitous delivery, Precipitate labor      Name: DAVID NELSON      Apgar1: 7  Apgar5: 7   4 AB            3             2 Term            1 Term              Past Medical History:   Diagnosis Date    Anxiety     Depression     Drug abuse      No past surgical history on file.    PTA Medications   Medication Sig    etodolac (LODINE) 400 MG tablet Take 1 tablet (400 mg total) by mouth every 8 (eight) hours as needed (pain).    PNV,calcium 72-iron-folic acid (PRENATAL PLUS, CALCIUM CARB,) 27 mg iron- 1 mg Tab Take 1 tablet (1 each total) by mouth once daily.    PRENATAL VIT/IRON FUMARATE/FA (PRENATAL 1+1 ORAL) Take by mouth.       Review of patient's allergies indicates:  No Known Allergies     Family History     Problem Relation (Age of Onset)    Breast cancer Sister    Hypertension Father    Ovarian cancer Maternal Grandmother    Stroke Father        Tobacco Use    Smoking status: Current Every Day Smoker     Packs/day: 0.50     Types: Cigarettes    Smokeless tobacco: Never Used   Substance and Sexual Activity    Alcohol use: No    Drug use: Yes     Types: Methamphetamines     Comment: crystal meth- smoke    Sexual activity: Yes     Partners: Male     Review of Systems   All other systems reviewed and are negative.     Objective:     Vital Signs (Most Recent):  Temp: 96.1 °F (35.6 °C) (20)  Pulse: 93 (20)  Resp: 20 (20)  BP:  120/87 (01/12/20 1122) Vital Signs (24h Range):  Temp:  [96.1 °F (35.6 °C)] 96.1 °F (35.6 °C)  Pulse:  [93] 93  Resp:  [20] 20  BP: (120)/(87) 120/87        There is no height or weight on file to calculate BMI.    FHT: Cat 1 (reassuring)  TOCO:  Q 3 minutes    Physical Exam:   Constitutional: She is oriented to person, place, and time. She appears well-developed and well-nourished.    HENT:   Head: Normocephalic.    Eyes: Pupils are equal, round, and reactive to light.    Neck: Normal range of motion.    Cardiovascular: Normal rate, regular rhythm and normal heart sounds.     Pulmonary/Chest: Effort normal and breath sounds normal.        Abdominal: Soft. Bowel sounds are normal.     Genitourinary: Vagina normal and uterus normal.           Musculoskeletal: Normal range of motion.       Neurological: She is alert and oriented to person, place, and time.    Skin: Skin is warm and dry.    Psychiatric: She has a normal mood and affect. Her behavior is normal. Judgment and thought content normal.       Significant Labs:  Lab Results   Component Value Date    GROUPTRH A NEG 09/09/2017    HEPBSAG Negative 09/10/2017       I have personallly reviewed all pertinent lab results from the last 24 hours.

## 2020-01-12 NOTE — NURSING
Dr. ILDA Sutton notified of pt. Here,VE=7-8/90/-1, fht = wnl, ? [facility]OM @ [place], unknown GBS status, orders noted;

## 2020-01-12 NOTE — HPI
Patient presented to Labor and delivery complaining of contractions.  Patient states contractions began midnight.  [place] a [place] and a half ago.  Patient has 6 living children 1 reported as pre term.  She has had 2 miscarriages.  Patient denies any problems with any pregnancy prior to this 1 or in this pregnancy.  She admits to good fetal movement denies any vaginal bleeding.

## 2020-01-13 VITALS
SYSTOLIC BLOOD PRESSURE: 111 MMHG | TEMPERATURE: 98 F | WEIGHT: 145 LBS | OXYGEN SATURATION: 99 % | RESPIRATION RATE: 17 BRPM | HEIGHT: 63 IN | BODY MASS INDEX: 25.69 KG/M2 | HEART RATE: 78 BPM | DIASTOLIC BLOOD PRESSURE: 58 MMHG

## 2020-01-13 LAB
BASOPHILS # BLD AUTO: 0.08 K/UL (ref 0–0.2)
BASOPHILS NFR BLD: 0.5 % (ref 0–1.9)
DIFFERENTIAL METHOD: ABNORMAL
EOSINOPHIL # BLD AUTO: 0.4 K/UL (ref 0–0.5)
EOSINOPHIL NFR BLD: 2.4 % (ref 0–8)
ERYTHROCYTE [DISTWIDTH] IN BLOOD BY AUTOMATED COUNT: 14 % (ref 11.5–14.5)
HBV SURFACE AG SERPL QL IA: NEGATIVE
HCT VFR BLD AUTO: 27.3 % (ref 37–48.5)
HGB BLD-MCNC: 8.6 G/DL (ref 12–16)
IMM GRANULOCYTES # BLD AUTO: 0.28 K/UL (ref 0–0.04)
IMM GRANULOCYTES NFR BLD AUTO: 1.7 % (ref 0–0.5)
LYMPHOCYTES # BLD AUTO: 3.4 K/UL (ref 1–4.8)
LYMPHOCYTES NFR BLD: 20.4 % (ref 18–48)
MCH RBC QN AUTO: 29.1 PG (ref 27–31)
MCHC RBC AUTO-ENTMCNC: 31.5 G/DL (ref 32–36)
MCV RBC AUTO: 92 FL (ref 82–98)
MONOCYTES # BLD AUTO: 1.4 K/UL (ref 0.3–1)
MONOCYTES NFR BLD: 8.3 % (ref 4–15)
NEUTROPHILS # BLD AUTO: 11.2 K/UL (ref 1.8–7.7)
NEUTROPHILS NFR BLD: 66.7 % (ref 38–73)
NRBC BLD-RTO: 0 /100 WBC
PLATELET # BLD AUTO: 480 K/UL (ref 150–350)
PMV BLD AUTO: 10 FL (ref 9.2–12.9)
RBC # BLD AUTO: 2.96 M/UL (ref 4–5.4)
RPR SER QL: NORMAL
RUBV IGG SER-ACNC: 7.7 IU/ML
RUBV IGG SER-IMP: ABNORMAL
WBC # BLD AUTO: 16.73 K/UL (ref 3.9–12.7)

## 2020-01-13 PROCEDURE — S4991 NICOTINE PATCH NONLEGEND: HCPCS | Performed by: OBSTETRICS & GYNECOLOGY

## 2020-01-13 PROCEDURE — 99238 PR HOSPITAL DISCHARGE DAY,<30 MIN: ICD-10-PCS | Mod: ,,, | Performed by: ADVANCED PRACTICE MIDWIFE

## 2020-01-13 PROCEDURE — 25000003 PHARM REV CODE 250: Performed by: OBSTETRICS & GYNECOLOGY

## 2020-01-13 PROCEDURE — 85025 COMPLETE CBC W/AUTO DIFF WBC: CPT

## 2020-01-13 PROCEDURE — 36415 COLL VENOUS BLD VENIPUNCTURE: CPT

## 2020-01-13 PROCEDURE — 63600175 PHARM REV CODE 636 W HCPCS: Performed by: OBSTETRICS & GYNECOLOGY

## 2020-01-13 PROCEDURE — 99238 HOSP IP/OBS DSCHRG MGMT 30/<: CPT | Mod: ,,, | Performed by: ADVANCED PRACTICE MIDWIFE

## 2020-01-13 RX ORDER — IBUPROFEN 600 MG/1
600 TABLET ORAL 3 TIMES DAILY
Qty: 30 TABLET | Refills: 3 | Status: SHIPPED | OUTPATIENT
Start: 2020-01-13 | End: 2021-03-24

## 2020-01-13 RX ADMIN — PRENATAL VITAMINS-IRON FUMARATE 27 MG IRON-FOLIC ACID 0.8 MG TABLET 1 TABLET: at 08:01

## 2020-01-13 RX ADMIN — KETOROLAC TROMETHAMINE 30 MG: 30 INJECTION, SOLUTION INTRAMUSCULAR at 06:01

## 2020-01-13 RX ADMIN — DOCUSATE SODIUM 200 MG: 100 CAPSULE, LIQUID FILLED ORAL at 12:01

## 2020-01-13 RX ADMIN — OXYCODONE HYDROCHLORIDE AND ACETAMINOPHEN 1 TABLET: 5; 325 TABLET ORAL at 12:01

## 2020-01-13 RX ADMIN — NICOTINE 1 PATCH: 21 PATCH TRANSDERMAL at 08:01

## 2020-01-13 NOTE — PROGRESS NOTES
[facility] Mother Baby Unit  Obstetrics  Postpartum Progress Note    Patient Name: Nery Nelson  MRN: [id number]  Admission Date: 2020  Hospital Length of Stay: 1 days  Attending Physician: Bashir Sutton MD  Primary Care Provider: Primary Doctor No    Subjective:     Principal Problem:Normal labor and delivery    This pregnancy has been complicated by no recent prenatal care    OB History    Para Term  AB Living   7 5 2 2 1 5   SAB TAB Ectopic Multiple Live Births   0 0 0 0 2      # Outcome Date GA Lbr Harshal/2nd Weight Sex Delivery Anes PTL Lv   7 Current            6 Para 17   2.336 kg (5 lb 2.4 oz) M Vag-Spont None  ALLEN      Complications: Precipitate labor, Precipitous delivery      Name: NORTH NELSON      Apgar1: 7  Apgar5: 8   5  16   2.676 kg (5 lb 14.4 oz) F Vag-Spont None  ALLEN      Complications: Precipitous delivery, Precipitate labor      Name: DAVID NELSON      Apgar1: 7  Apgar5: 7   4 AB            3             2 Term            1 Term              Past Medical History:   Diagnosis Date    Anxiety     Depression     Drug abuse      No past surgical history on file.    PTA Medications   Medication Sig    etodolac (LODINE) 400 MG tablet Take 1 tablet (400 mg total) by mouth every 8 (eight) hours as needed (pain).    PNV,calcium 72-iron-folic acid (PRENATAL PLUS, CALCIUM CARB,) 27 mg iron- 1 mg Tab Take 1 tablet (1 each total) by mouth once daily.    PRENATAL VIT/IRON FUMARATE/FA (PRENATAL 1+1 ORAL) Take by mouth.       Review of patient's allergies indicates:  No Known Allergies     Family History     Problem Relation (Age of Onset)    Breast cancer Sister    Hypertension Father    Ovarian cancer Maternal Grandmother    Stroke Father        Tobacco Use    Smoking status: Current Every Day Smoker     Packs/day: 0.50     Types: Cigarettes    Smokeless tobacco: Never Used   Substance and Sexual Activity    Alcohol use: No    Drug use: Yes      Types: Methamphetamines     Comment: crystal meth- smoke    Sexual activity: Yes     Partners: Male     Review of Systems   All other systems reviewed and are negative.     Objective:     Vital Signs (Most Recent):  Temp: 96.1 °F (35.6 °C) (20 1122)  Pulse: 93 (20 1122)  Resp: 20 (20 1122)  BP: 120/87 (20 1122) Vital Signs (24h Range):  Temp:  [96.1 °F (35.6 °C)] 96.1 °F (35.6 °C)  Pulse:  [93] 93  Resp:  [20] 20  BP: (120)/(87) 120/87        There is no height or weight on file to calculate BMI.    FHT: Cat 1 (reassuring)  TOCO:  Q 3 minutes    Physical Exam:   Constitutional: She is oriented to person, place, and time. She appears well-developed and well-nourished.    HENT:   Head: Normocephalic.    Eyes: Pupils are equal, round, and reactive to light.    Neck: Normal range of motion.    Cardiovascular: Normal rate, regular rhythm and normal heart sounds.     Pulmonary/Chest: Effort normal and breath sounds normal.        Abdominal: Soft. Bowel sounds are normal.     Genitourinary: Vagina normal and uterus normal.           Musculoskeletal: Normal range of motion.       Neurological: She is alert and oriented to person, place, and time.    Skin: Skin is warm and dry.    Psychiatric: She has a normal mood and affect. Her behavior is normal. Judgment and thought content normal.       Significant Labs:  Lab Results   Component Value Date    GROUPTRH A NEG 2017    HEPBSAG Negative 09/10/2017       I have personallly reviewed all pertinent lab results from the last 24 hours.    Assessment/Plan:     34 y.o. female  for:    No notes have been filed under this hospital service.  Service: Obstetrics and Gynecology      Disposition: As patient meets milestones, will plan to discharge tomorrow.    Izzy Noriega CNM  Obstetrics  [place] - Mother Baby Unit

## 2020-01-13 NOTE — NURSING
Received patient STAR Kelley RN.Patient awake alert Fundus is firm and 2 below and midline as reported.Report given to oncoming shift 10 minutes after receiving patient to floor.Oriented patient to room and how to call for needs or assistance.S.O. In room.

## 2020-01-13 NOTE — PLAN OF CARE
"Stable shift. Pt tolerated all meds and procedures well. V/S stable. NAD noted. See flow sheets for details. C/O pain w/ relief from PO pain meds. Pt wants to know "when can I go smoke?" Explained to pt that she is not allowed to leave the floor or to go smoke; pt states understanding. Offered pt nicotine patch and hard candies; pt accepted both. Pt attentive to infant during shift. Plan of care reviewed w/ pt; pt states understanding.     Formula Feeding Guide given and explained. Handouts included in the guide are as follows: Safe Bottle Feeding, WIC- Let your Baby Set the Pace for Bottle Feeding,  Formula Feeding Record, WISE- Formula Feeding, Managing Non-nursing Engorgement, Community Resources, & Baby Feeding Cues (signs). Instructed to feed on demand/cue, 8 or more times in 24 hours, utilizing paced bottle feeding technique. Feed baby until fullness cues observed. Questions/Concerns answered. Mother verbalized and demonstrated understanding.    "

## 2020-01-13 NOTE — H&P
Wash in shower every day, Dove soap, dry 100% with blow dryer.    Try to keep the right foot above the heart until some improvement.    \"The toes as high as the nose\"     Start oral antibiotic today first dose ASAP, then at 11 PM.     Then tomorrow 8 AM and 8 PM, each day.    Best to be seen on Monday for follow up, if you like you can come back and see me in urgent care    Call me Saturday, if more red, pain, warmth' or swelling [phone]    Hot soaks to the right ankle every 2 hours for 5 minutes, while awake until some improvement.  Add one tablespoon of salt per gallon.    *NOTE: We have extended our hours of operation -- see below!    --------------------------------------------------------------------------------------------------------------  [facility]    Monday - Thursday 8 a.m. - 8 p.m.  *Friday  8 a.m. - 8 p.m.  *Saturday  8 a.m. - 4 p.m.  Sunday   Closed  -*-*-*-*-*-*-*-  The Urgent Care at [facility] ([address] [place])   is now open on SUNDAYS: 8 a.m. to 4 p.m.  -*-*-*-*-*-*-*-  [email or web address]  See current wait times for [facility] in real-time!  Reserve your waiting-room spot in line!   Receive text/email messages if our wait times are long,  so that you can do your waiting at home or work, instead of in our waiting room.     Note: This system does not guarantee an \"appointment time\" to see a provider. Also, patients may be called out of the waiting room \"ahead of turn\" in emergency situations, at discretion of Urgent Care staff.  ----------------  Thank you for choosing [facility] today. We hope you had a pleasant experience and we look forward to serving your future needs.    If you have any questions about your VISIT, please call [phone].    If you have any questions about your BILL, please call [phone].    If you need a copy of your MEDICAL RECORD, please call [phone].    If you receive a survey in the mail  [facility] Mother Baby Unit  Obstetrics  History & Physical    Patient Name: Nery Nelson  MRN: [id number]  Admission Date: 2020  Primary Care Provider: Primary Doctor No    Subjective:     Principal Problem:Normal labor and delivery    History of Present Illness:  Patient presented to Labor and delivery complaining of contractions.  Patient states contractions began midnight.  Patient [place] state until a month and a half ago.  Patient has 6 living children 1 reported as pre term.  She has had 2 miscarriages.  Patient denies any problems with any pregnancy prior to this 1 or in this pregnancy.  She admits to good fetal movement denies any vaginal bleeding.    Obstetric HPI:  Patient reports strong steady uterine contractions, active fetal movement, No vaginal bleeding , No loss of fluid     This pregnancy has been complicated by     OB History    Para Term  AB Living   7 6 2 2 1 6   SAB TAB Ectopic Multiple Live Births   0 0 0 0 3      # Outcome Date GA Lbr Harshal/2nd Weight Sex Delivery Anes PTL Lv   7 Para 20   3.204 kg (7 lb 1 oz) F Vag-Spont None N ALLEN      Birth Comments: moderate meconium stained skin       Name: DAVID NELSON      Apgar1: 8  Apgar5: 9   6 Para 17   2.336 kg (5 lb 2.4 oz) M Vag-Spont None  ALLEN      Complications: Precipitate labor, Precipitous delivery      Name: NORTH NELSON      Apgar1: 7  Apgar5: 8   5  16   2.676 kg (5 lb 14.4 oz) F Vag-Spont None  ALLEN      Complications: Precipitous delivery, Precipitate labor      Name: DAVID NELSON      Apgar1: 7  Apgar5: 7   4 AB            3             2 Term            1 Term              Past Medical History:   Diagnosis Date    Anxiety     Depression     Drug abuse      History reviewed. No pertinent surgical history.    PTA Medications   Medication Sig    PNV,calcium 72-iron-folic acid (PRENATAL PLUS, CALCIUM CARB,) 27 mg iron- 1 mg Tab Take 1 tablet (1  about today's services, we hope that you will take a few minutes to let us know about your experience.  --------------------------------------------------------------------------------------------------------------  UNLESS OTHERWISE INSTRUCTED BY YOUR URGENT CARE PROVIDER TODAY, all follow-up for your medical issues should be managed by your primary care provider. The Urgent Care does not manage chronic medical issues or refill medications. You are responsible for scheduling and keeping any necessary follow-up visits with your primary care provider after this visit today.   --------------------------------------------------------------------------------------------------------------  IF YOU WERE PRESCRIBED AN ANTIBIOTIC TODAY: We recommend taking an over-the-counter probiotic (Such as Florajen 3 for adults, or [id number] for children -- AVAILABLE IN THE [facility] and other local pharmacies too) once a day for the entire duration of your antibiotics, and continuing it for 2 weeks after the antibiotics are finished. This will help reduce your chance of developing antibiotic-related diarrhea and/or yeast infections.  --------------------------------------------------------------------------------------------------------------  IF YOU HAVE LAB RESULTS or XRAY REPORTS STILL PENDING: We typically call patients back only if (1) the results are \"significantly abnormal\", (2) we need to change your treatment plan based on the results, or (3) the report is different than what we told you during your visit. If we have not called you about your results 48 hours after your visit, you can call us at [phone] to check on the status.  --------------------------------------------------------------------------------------------------------------             each total) by mouth once daily.    [DISCONTINUED] etodolac (LODINE) 400 MG tablet Take 1 tablet (400 mg total) by mouth every 8 (eight) hours as needed (pain).    [DISCONTINUED] PRENATAL VIT/IRON FUMARATE/FA (PRENATAL 1+1 ORAL) Take by mouth.       Review of patient's allergies indicates:  No Known Allergies     Family History     Problem Relation (Age of Onset)    Breast cancer Sister    Hypertension Father    Ovarian cancer Maternal Grandmother    Stroke Father        Tobacco Use    Smoking status: Current Every Day Smoker     Packs/day: 0.50     Types: Cigarettes    Smokeless tobacco: Never Used   Substance and Sexual Activity    Alcohol use: No    Drug use: Yes     Types: Methamphetamines     Comment: crystal meth- smoke    Sexual activity: Yes     Partners: Male     Review of Systems   All other systems reviewed and are negative.     Objective:     Vital Signs (Most Recent):  Temp: 97.8 °F (36.6 °C) (01/13/20 0745)  Pulse: 78 (01/13/20 0745)  Resp: 17 (01/13/20 0745)  BP: (!) 111/58 (01/13/20 0745)  SpO2: 99 % (01/13/20 0215) Vital Signs (24h Range):  Temp:  [96.1 °F (35.6 °C)-97.8 °F (36.6 °C)] 97.8 °F (36.6 °C)  Pulse:  [75-93] 78  Resp:  [17-20] 17  SpO2:  [99 %-100 %] 99 %  BP: (111-127)/(58-87) 111/58     Weight: 65.8 kg (145 lb)  Body mass index is 25.69 kg/m².    FHT: Cat 1 (reassuring)  TOCO:  Q 3 minutes    Physical Exam:   Constitutional: She appears well-developed and well-nourished.       Cardiovascular: Normal rate, regular rhythm and normal heart sounds.     Pulmonary/Chest: Effort normal and breath sounds normal.        Abdominal: Soft. Bowel sounds are normal.                       Cervix:  Dilation:  8  Effacement:  75%  Station: 0  Presentation: Vertex     Significant Labs:  Lab Results   Component Value Date    GROUPTRH A NEG 01/12/2020    HEPBSAG Negative 01/12/2020       I have personallly reviewed all pertinent lab results from the last 24 hours.    Assessment/Plan:     34 y.o.  female  at Unknown for:    No notes have been filed under this hospital service.  Service: Obstetrics and Gynecology      Bashir Sutton MD  Obstetrics  [place] - Mother Baby Unit

## 2020-01-13 NOTE — NURSING
Transferred to rm 328 per w/c, infant cradled in arms; in satisfactory condition, no distress noted;

## 2020-01-13 NOTE — SUBJECTIVE & OBJECTIVE
Obstetric HPI:  Patient reports strong steady uterine contractions, active fetal movement, No vaginal bleeding , No loss of fluid     This pregnancy has been complicated by     OB History    Para Term  AB Living   7 6 2 2 1 6   SAB TAB Ectopic Multiple Live Births   0 0 0 0 3      # Outcome Date GA Lbr Harshal/2nd Weight Sex Delivery Anes PTL Lv   7 Para 20   3.204 kg (7 lb 1 oz) F Vag-Spont None N ALLEN      Birth Comments: moderate meconium stained skin       Name: DAVID NELSON      Apgar1: 8  Apgar5: 9   6 Para 17   2.336 kg (5 lb 2.4 oz) M Vag-Spont None  ALLEN      Complications: Precipitate labor, Precipitous delivery      Name: NORTH NELSON      Apgar1: 7  Apgar5: 8   5  16   2.676 kg (5 lb 14.4 oz) F Vag-Spont None  ALLEN      Complications: Precipitous delivery, Precipitate labor      Name: DAVID NELSON      Apgar1: 7  Apgar5: 7   4 AB            3             2 Term            1 Term              Past Medical History:   Diagnosis Date    Anxiety     Depression     Drug abuse      History reviewed. No pertinent surgical history.    PTA Medications   Medication Sig    PNV,calcium 72-iron-folic acid (PRENATAL PLUS, CALCIUM CARB,) 27 mg iron- 1 mg Tab Take 1 tablet (1 each total) by mouth once daily.    [DISCONTINUED] etodolac (LODINE) 400 MG tablet Take 1 tablet (400 mg total) by mouth every 8 (eight) hours as needed (pain).    [DISCONTINUED] PRENATAL VIT/IRON FUMARATE/FA (PRENATAL 1+1 ORAL) Take by mouth.       Review of patient's allergies indicates:  No Known Allergies     Family History     Problem Relation (Age of Onset)    Breast cancer Sister    Hypertension Father    Ovarian cancer Maternal Grandmother    Stroke Father        Tobacco Use    Smoking status: Current Every Day Smoker     Packs/day: 0.50     Types: Cigarettes    Smokeless tobacco: Never Used   Substance and Sexual Activity    Alcohol use: No    Drug use: Yes     Types:  Methamphetamines     Comment: crystal meth- smoke    Sexual activity: Yes     Partners: Male     Review of Systems   All other systems reviewed and are negative.     Objective:     Vital Signs (Most Recent):  Temp: 97.8 °F (36.6 °C) (01/13/20 0745)  Pulse: 78 (01/13/20 0745)  Resp: 17 (01/13/20 0745)  BP: (!) 111/58 (01/13/20 0745)  SpO2: 99 % (01/13/20 0215) Vital Signs (24h Range):  Temp:  [96.1 °F (35.6 °C)-97.8 °F (36.6 °C)] 97.8 °F (36.6 °C)  Pulse:  [75-93] 78  Resp:  [17-20] 17  SpO2:  [99 %-100 %] 99 %  BP: (111-127)/(58-87) 111/58     Weight: 65.8 kg (145 lb)  Body mass index is 25.69 kg/m².    FHT: Cat 1 (reassuring)  TOCO:  Q 3 minutes    Physical Exam:   Constitutional: She appears well-developed and well-nourished.       Cardiovascular: Normal rate, regular rhythm and normal heart sounds.     Pulmonary/Chest: Effort normal and breath sounds normal.        Abdominal: Soft. Bowel sounds are normal.                       Cervix:  Dilation:  8  Effacement:  75%  Station: 0  Presentation: Vertex     Significant Labs:  Lab Results   Component Value Date    GROUPTRH A NEG 01/12/2020    HEPBSAG Negative 01/12/2020       I have personallly reviewed all pertinent lab results from the last 24 hours.

## 2020-01-13 NOTE — NURSING
Up to bathroom, voided 250cc urine w/o difficulty, educated on pericare, ambulated back to bed,  pt. Tolerated procedure well;

## 2020-01-13 NOTE — PLAN OF CARE
Mother and baby bonding well. Pt is ambulating in room, voiding without difficulty, and performing own self care. She is tolerating a regular diet without emesis. Pain is being controlled without the use of PRN medications. Vitals have been stable. Pt states no needs or concerns at this time. NADN. Discharge instructions given per ARLETH Lomax RN. Pt is aware of DCFS POC and [facility]rent[facility]. Pt escorted to police care per [facility] deputy.

## 2020-01-13 NOTE — DISCHARGE SUMMARY
[facility] Mother Baby Unit  Obstetrics  Discharge Summary      Patient Name: Nery Brown  MRN: [id number]  Admission Date: 2020  Hospital Length of Stay: 1 days  Discharge Date and Time:  2020 1:09 PM  Attending Physician: Bashir Sutton MD   Discharging Provider: Izzy Noriega CNM   Primary Care Provider: Primary Doctor No    HPI: Patient presented to Labor and delivery complaining of contractions.  Patient states contractions began midnight.  Patient [place] a month and a half ago.  Patient has 6 living children 1 reported as pre term.  She has had 2 miscarriages.  Patient denies any problems with any pregnancy prior to this 1 or in this pregnancy.  She admits to good fetal movement denies any vaginal bleeding.        * No surgery found *     Hospital Course:   PPD1 routine care , dcfs HAS TAKEN CUSTODY OF THE  AND PT IS DESIRING TO BE DC      Consults (From admission, onward)        Status Ordering Provider     Consult to Lactation  Use PRN     Provider:  (Not yet assigned)    Acknowledged BASHIR SUTTON          Final Active Diagnoses:    Diagnosis Date Noted POA    PRINCIPAL PROBLEM:  Normal labor and delivery [O80] 2020 Not Applicable    Threatened labor at term [O47.9] 2020 Yes      Problems Resolved During this Admission:        Labs: All labs within the past 24 hours have been reviewed    Feeding Method: BOTTLE     Immunizations     Date Immunization Status Dose Route/Site Given by    20 1453 MMR Incomplete 0.5 mL Subcutaneous/Left deltoid     20 1453 Tdap Incomplete 0.5 mL Intramuscular/Left deltoid     20 2156 Rho (D) Immune Globulin - IM Given 300 mcg Intramuscular/ Alexei Mckeon RN          Delivery:    Episiotomy: None   Lacerations: None   Repair suture: None   Repair # of packets:     Blood loss (ml):       Birth information:  YOB: 2020   Time of birth: 1:32 PM   Sex: female   Delivery  type: Vaginal, Spontaneous   Gestational Age: <None>    Delivery Clinician:      Other providers:       Additional  information:  Forceps:    Vacuum:    Breech:    Observed anomalies moderate meconium stained skin      Living?:           APGARS  One minute Five minutes Ten minutes   Skin color:         Heart rate:         Grimace:         Muscle tone:         Breathing:         Totals: 8  9        Placenta: Delivered:       appearance    Pending Diagnostic Studies:     Procedure Component Value Units Date/Time    RPR [[id number]] Collected:  01/12/20 1145    Order Status:  Sent Lab Status:  In process Updated:  01/12/20 1158    Specimen:  Blood           Discharged Condition: fair    Disposition: Home or Self Care    Follow Up:  Follow-up Information     Follow up In 2 weeks.               Patient Instructions:      Notify your health care provider if you experience any of the following:  temperature >100.4     Notify your health care provider if you experience any of the following:  persistent nausea and vomiting or diarrhea     Notify your health care provider if you experience any of the following:  severe uncontrolled pain     Medications:  Current Discharge Medication List      START taking these medications    Details   ibuprofen (ADVIL,MOTRIN) 600 MG tablet Take 1 tablet (600 mg total) by mouth 3 (three) times daily.  Qty: 30 tablet, Refills: 3         CONTINUE these medications which have NOT CHANGED    Details   PNV,calcium 72-iron-folic acid (PRENATAL PLUS, CALCIUM CARB,) 27 mg iron- 1 mg Tab Take 1 tablet (1 each total) by mouth once daily.  Qty: 30 tablet, Refills: 11    Comments: OK to substitute comparable prenatal vitamin covered by pt's insurance  Associated Diagnoses: Encounter for supervision of other normal pregnancy in first trimester         STOP taking these medications       etodolac (LODINE) 400 MG tablet Comments:   Reason for Stopping:         PRENATAL VIT/IRON FUMARATE/FA (PRENATAL 1+1  ORAL) Comments:   Reason for Stopping:               Izzy Noriega CNM  Obstetrics  [facility] Mother Baby Unit

## 2021-03-22 ENCOUNTER — TELEPHONE (OUTPATIENT)
Dept: OBSTETRICS AND GYNECOLOGY | Facility: CLINIC | Age: 36
End: 2021-03-22

## 2021-03-24 ENCOUNTER — OFFICE VISIT (OUTPATIENT)
Dept: OBSTETRICS AND GYNECOLOGY | Facility: CLINIC | Age: 36
End: 2021-03-24
Payer: MEDICAID

## 2021-03-24 ENCOUNTER — PROCEDURE VISIT (OUTPATIENT)
Dept: OBSTETRICS AND GYNECOLOGY | Facility: CLINIC | Age: 36
End: 2021-03-24
Payer: MEDICAID

## 2021-03-24 VITALS
HEART RATE: 60 BPM | RESPIRATION RATE: 14 BRPM | WEIGHT: 115.63 LBS | SYSTOLIC BLOOD PRESSURE: 114 MMHG | BODY MASS INDEX: 20.49 KG/M2 | HEIGHT: 63 IN | DIASTOLIC BLOOD PRESSURE: 68 MMHG

## 2021-03-24 DIAGNOSIS — Z32.01 PREGNANCY EXAMINATION OR TEST, POSITIVE RESULT: Primary | ICD-10-CM

## 2021-03-24 DIAGNOSIS — Z36.89 ENCOUNTER TO ESTABLISH GESTATIONAL AGE USING ULTRASOUND: ICD-10-CM

## 2021-03-24 DIAGNOSIS — Z12.4 SCREENING FOR CERVICAL CANCER: ICD-10-CM

## 2021-03-24 DIAGNOSIS — Z32.01 PREGNANCY EXAMINATION OR TEST, POSITIVE RESULT: ICD-10-CM

## 2021-03-24 LAB
B-HCG UR QL: POSITIVE
CTP QC/QA: YES

## 2021-03-24 PROCEDURE — 76815 OB US LIMITED FETUS(S): CPT | Mod: 26,S$PBB,, | Performed by: OBSTETRICS & GYNECOLOGY

## 2021-03-24 PROCEDURE — 87086 URINE CULTURE/COLONY COUNT: CPT | Performed by: OBSTETRICS & GYNECOLOGY

## 2021-03-24 PROCEDURE — 99999 PR PBB SHADOW E&M-EST. PATIENT-LVL III: CPT | Mod: PBBFAC,,, | Performed by: OBSTETRICS & GYNECOLOGY

## 2021-03-24 PROCEDURE — 87591 N.GONORRHOEAE DNA AMP PROB: CPT | Performed by: OBSTETRICS & GYNECOLOGY

## 2021-03-24 PROCEDURE — 87186 SC STD MICRODIL/AGAR DIL: CPT | Performed by: OBSTETRICS & GYNECOLOGY

## 2021-03-24 PROCEDURE — 87625 HPV TYPES 16 & 18 ONLY: CPT | Performed by: OBSTETRICS & GYNECOLOGY

## 2021-03-24 PROCEDURE — 88175 CYTOPATH C/V AUTO FLUID REDO: CPT | Performed by: OBSTETRICS & GYNECOLOGY

## 2021-03-24 PROCEDURE — 87077 CULTURE AEROBIC IDENTIFY: CPT | Performed by: OBSTETRICS & GYNECOLOGY

## 2021-03-24 PROCEDURE — 76815 OB US LIMITED FETUS(S): CPT | Mod: PBBFAC | Performed by: OBSTETRICS & GYNECOLOGY

## 2021-03-24 PROCEDURE — 87624 HPV HI-RISK TYP POOLED RSLT: CPT | Performed by: OBSTETRICS & GYNECOLOGY

## 2021-03-24 PROCEDURE — 81025 URINE PREGNANCY TEST: CPT | Mod: PBBFAC | Performed by: OBSTETRICS & GYNECOLOGY

## 2021-03-24 PROCEDURE — 99213 OFFICE O/P EST LOW 20 MIN: CPT | Mod: PBBFAC,TH | Performed by: OBSTETRICS & GYNECOLOGY

## 2021-03-24 PROCEDURE — 99204 OFFICE O/P NEW MOD 45 MIN: CPT | Mod: S$PBB,TH,, | Performed by: OBSTETRICS & GYNECOLOGY

## 2021-03-24 PROCEDURE — 76815 PR  US,PREGNANT UTERUS,LIMITED, 1/> FETUSES: ICD-10-PCS | Mod: 26,S$PBB,, | Performed by: OBSTETRICS & GYNECOLOGY

## 2021-03-24 PROCEDURE — 99204 PR OFFICE/OUTPT VISIT, NEW, LEVL IV, 45-59 MIN: ICD-10-PCS | Mod: S$PBB,TH,, | Performed by: OBSTETRICS & GYNECOLOGY

## 2021-03-24 PROCEDURE — 99999 PR PBB SHADOW E&M-EST. PATIENT-LVL III: ICD-10-PCS | Mod: PBBFAC,,, | Performed by: OBSTETRICS & GYNECOLOGY

## 2021-03-24 PROCEDURE — 87491 CHLMYD TRACH DNA AMP PROBE: CPT | Performed by: OBSTETRICS & GYNECOLOGY

## 2021-03-24 PROCEDURE — 87088 URINE BACTERIA CULTURE: CPT | Performed by: OBSTETRICS & GYNECOLOGY

## 2021-03-24 RX ORDER — ASCORBIC ACID, CHOLECALCIFEROL, .ALPHA.-TOCOPHEROL ACETATE, DL-, PYRIDOXINE HYDROCHLORIDE, FOLIC ACID, CYANOCOBALAMIN, BIOTIN, CALCIUM CARBONATE, FERROUS ASPARTO GLYCINATE, IRON, POTASSIUM IODIDE, MAGNESIUM OXIDE, DOCONEXENT AND LOWBUSH BLUEBERRY 60; 1000; 10; 26; 400; 13; 280; 80; 9; 9; 150; 25; 350; 25; 600 MG/1; [IU]/1; [IU]/1; MG/1; UG/1; UG/1; UG/1; MG/1; MG/1; MG/1; UG/1; MG/1; MG/1; MG/1; UG/1
1 CAPSULE, GELATIN COATED ORAL DAILY
Qty: 30 CAPSULE | Refills: 12 | Status: SHIPPED | OUTPATIENT
Start: 2021-03-24

## 2021-03-24 RX ORDER — LACTOBACILLUS COMBINATION NO.9 4B CELL
CAPSULE ORAL
Status: ON HOLD | COMMUNITY
End: 2021-08-20 | Stop reason: HOSPADM

## 2021-03-25 LAB
C TRACH DNA SPEC QL NAA+PROBE: NOT DETECTED
N GONORRHOEA DNA SPEC QL NAA+PROBE: NOT DETECTED

## 2021-03-26 DIAGNOSIS — O23.42 URINARY TRACT INFECTION IN MOTHER DURING SECOND TRIMESTER OF PREGNANCY: Primary | ICD-10-CM

## 2021-03-26 RX ORDER — NITROFURANTOIN 25; 75 MG/1; MG/1
100 CAPSULE ORAL 2 TIMES DAILY
Qty: 14 CAPSULE | Refills: 0 | Status: SHIPPED | OUTPATIENT
Start: 2021-03-26 | End: 2021-04-02

## 2021-03-27 LAB — BACTERIA UR CULT: ABNORMAL

## 2021-04-02 LAB
CLINICAL INFO: ABNORMAL
CYTO CVX: ABNORMAL
CYTOLOGIST CVX/VAG CYTO: ABNORMAL
CYTOLOGY CMNT CVX/VAG CYTO-IMP: ABNORMAL
CYTOLOGY PAP THIN PREP EXPLANATION: ABNORMAL
DATE OF PREVIOUS PAP: ABNORMAL
DATE PREVIOUS BX: NO
GEN CATEG CVX/VAG CYTO-IMP: ABNORMAL
HPV I/H RISK 4 DNA CVX QL NAA+PROBE: DETECTED
LMP START DATE: ABNORMAL
PATHOLOGIST CVX/VAG CYTO: ABNORMAL
SPECIMEN SOURCE CVX/VAG CYTO: ABNORMAL
STAT OF ADQ CVX/VAG CYTO-IMP: ABNORMAL

## 2021-04-05 LAB
HPV16 DNA CVX QL PROBE+SIG AMP: NORMAL
HPV18 DNA CVX QL PROBE+SIG AMP: NORMAL

## 2021-05-04 ENCOUNTER — PATIENT MESSAGE (OUTPATIENT)
Dept: RESEARCH | Facility: HOSPITAL | Age: 36
End: 2021-05-04

## 2021-08-19 PROBLEM — R10.30 LOWER ABDOMINAL PAIN: Status: ACTIVE | Noted: 2021-08-19

## 2023-01-27 ENCOUNTER — HOSPITAL ENCOUNTER (EMERGENCY)
Facility: HOSPITAL | Age: 38
Discharge: LEFT BEFORE DISCHARGE | End: 2023-01-27
Attending: INTERNAL MEDICINE
Payer: MEDICAID

## 2023-01-27 VITALS
OXYGEN SATURATION: 100 % | RESPIRATION RATE: 16 BRPM | DIASTOLIC BLOOD PRESSURE: 79 MMHG | WEIGHT: 149.94 LBS | HEART RATE: 95 BPM | HEIGHT: 66 IN | BODY MASS INDEX: 24.1 KG/M2 | TEMPERATURE: 98 F | SYSTOLIC BLOOD PRESSURE: 123 MMHG

## 2023-01-27 DIAGNOSIS — O09.33 NO PRENATAL CARE IN CURRENT PREGNANCY IN THIRD TRIMESTER: ICD-10-CM

## 2023-01-27 DIAGNOSIS — O47.9 UTERINE CONTRACTIONS DURING PREGNANCY: Primary | ICD-10-CM

## 2023-01-27 LAB
ABS NEUT CALC (OHS): 14.35 X10(3)/MCL (ref 2.1–9.2)
ALBUMIN SERPL-MCNC: 2.5 G/DL (ref 3.5–5)
ALBUMIN/GLOB SERPL: 0.6 RATIO (ref 1.1–2)
ALP SERPL-CCNC: 120 UNIT/L (ref 40–150)
ALT SERPL-CCNC: 14 UNIT/L (ref 0–55)
ANISOCYTOSIS BLD QL SMEAR: ABNORMAL
APPEARANCE UR: ABNORMAL
AST SERPL-CCNC: 24 UNIT/L (ref 5–34)
BACTERIA #/AREA URNS AUTO: ABNORMAL /HPF
BILIRUB UR QL STRIP.AUTO: NEGATIVE MG/DL
BILIRUBIN DIRECT+TOT PNL SERPL-MCNC: 0.2 MG/DL
BUN SERPL-MCNC: 4.8 MG/DL (ref 7–18.7)
C TRACH DNA SPEC QL NAA+PROBE: NOT DETECTED
CALCIUM SERPL-MCNC: 9.6 MG/DL (ref 8.4–10.2)
CHLORIDE SERPL-SCNC: 106 MMOL/L (ref 98–107)
CO2 SERPL-SCNC: 21 MMOL/L (ref 22–29)
COLOR UR AUTO: ABNORMAL
CREAT SERPL-MCNC: 0.58 MG/DL (ref 0.55–1.02)
EOSINOPHIL NFR BLD MANUAL: 0.55 X10(3)/MCL (ref 0–0.9)
EOSINOPHIL NFR BLD MANUAL: 3 % (ref 0–8)
ERYTHROCYTE [DISTWIDTH] IN BLOOD BY AUTOMATED COUNT: 14.5 % (ref 11.5–17)
GFR SERPLBLD CREATININE-BSD FMLA CKD-EPI: >60 MLS/MIN/1.73/M2
GLOBULIN SER-MCNC: 4.5 GM/DL (ref 2.4–3.5)
GLUCOSE SERPL-MCNC: 94 MG/DL (ref 74–100)
GLUCOSE UR QL STRIP.AUTO: NORMAL MG/DL
HCT VFR BLD AUTO: 29.5 % (ref 37–47)
HGB BLD-MCNC: 9.2 GM/DL (ref 12–16)
HYALINE CASTS #/AREA URNS LPF: ABNORMAL /LPF
IMM GRANULOCYTES # BLD AUTO: 1.66 X10(3)/MCL (ref 0–0.04)
IMM GRANULOCYTES NFR BLD AUTO: 9 %
KETONES UR QL STRIP.AUTO: NEGATIVE MG/DL
LEUKOCYTE ESTERASE UR QL STRIP.AUTO: NEGATIVE UNIT/L
LYMPHOCYTES NFR BLD MANUAL: 12 % (ref 13–40)
LYMPHOCYTES NFR BLD MANUAL: 2.21 X10(3)/MCL
MCH RBC QN AUTO: 28 PG
MCHC RBC AUTO-ENTMCNC: 31.2 MG/DL (ref 33–36)
MCV RBC AUTO: 89.7 FL (ref 80–94)
METAMYELOCYTES NFR BLD MANUAL: 2 %
MONOCYTES NFR BLD MANUAL: 1.29 X10(3)/MCL (ref 0.1–1.3)
MONOCYTES NFR BLD MANUAL: 7 % (ref 2–11)
MUCOUS THREADS URNS QL MICRO: ABNORMAL /LPF
N GONORRHOEA DNA SPEC QL NAA+PROBE: NOT DETECTED
NEUTROPHILS NFR BLD MANUAL: 74 % (ref 47–80)
NEUTS BAND NFR BLD MANUAL: 2 % (ref 0–11)
NITRITE UR QL STRIP.AUTO: NEGATIVE
NRBC BLD AUTO-RTO: 0 %
PH UR STRIP.AUTO: 7 [PH]
PLATELET # BLD AUTO: 409 X10(3)/MCL (ref 130–400)
PLATELET # BLD EST: ABNORMAL 10*3/UL
PMV BLD AUTO: 10.5 FL (ref 7.4–10.4)
POTASSIUM SERPL-SCNC: 3.8 MMOL/L (ref 3.5–5.1)
PROT SERPL-MCNC: 7 GM/DL (ref 6.4–8.3)
PROT UR QL STRIP.AUTO: NEGATIVE MG/DL
RBC # BLD AUTO: 3.29 X10(6)/MCL (ref 4.2–5.4)
RBC #/AREA URNS AUTO: ABNORMAL /HPF
RBC MORPH BLD: ABNORMAL
RBC UR QL AUTO: NEGATIVE UNIT/L
SODIUM SERPL-SCNC: 136 MMOL/L (ref 136–145)
SP GR UR STRIP.AUTO: 1.01
SQUAMOUS #/AREA URNS LPF: ABNORMAL /HPF
TSH SERPL-ACNC: 1.3 UIU/ML (ref 0.35–4.94)
UROBILINOGEN UR STRIP-ACNC: NORMAL MG/DL
WBC # SPEC AUTO: 18.4 X10(3)/MCL (ref 4.5–11.5)
WBC #/AREA URNS AUTO: ABNORMAL /HPF

## 2023-01-27 PROCEDURE — 85027 COMPLETE CBC AUTOMATED: CPT | Performed by: INTERNAL MEDICINE

## 2023-01-27 PROCEDURE — 87591 N.GONORRHOEAE DNA AMP PROB: CPT | Performed by: INTERNAL MEDICINE

## 2023-01-27 PROCEDURE — 99283 EMERGENCY DEPT VISIT LOW MDM: CPT

## 2023-01-27 PROCEDURE — 63600175 PHARM REV CODE 636 W HCPCS: Performed by: INTERNAL MEDICINE

## 2023-01-27 PROCEDURE — 87491 CHLMYD TRACH DNA AMP PROBE: CPT | Performed by: INTERNAL MEDICINE

## 2023-01-27 PROCEDURE — 84443 ASSAY THYROID STIM HORMONE: CPT | Performed by: INTERNAL MEDICINE

## 2023-01-27 PROCEDURE — 81001 URINALYSIS AUTO W/SCOPE: CPT | Performed by: INTERNAL MEDICINE

## 2023-01-27 PROCEDURE — 80053 COMPREHEN METABOLIC PANEL: CPT | Performed by: INTERNAL MEDICINE

## 2023-01-27 PROCEDURE — 85025 COMPLETE CBC W/AUTO DIFF WBC: CPT | Performed by: INTERNAL MEDICINE

## 2023-01-27 RX ORDER — SODIUM CHLORIDE, SODIUM LACTATE, POTASSIUM CHLORIDE, CALCIUM CHLORIDE 600; 310; 30; 20 MG/100ML; MG/100ML; MG/100ML; MG/100ML
1000 INJECTION, SOLUTION INTRAVENOUS
Status: COMPLETED | OUTPATIENT
Start: 2023-01-27 | End: 2023-01-27

## 2023-01-27 RX ADMIN — SODIUM CHLORIDE, POTASSIUM CHLORIDE, SODIUM LACTATE AND CALCIUM CHLORIDE 1000 ML: 600; 310; 30; 20 INJECTION, SOLUTION INTRAVENOUS at 05:01

## 2023-01-27 NOTE — LETTER
Patient: Nery Brown  YOB: 1985  Date: 1/27/2023 Time: 6:51 PM  Location: [facility]    Leaving the Hospital Against Medical Advice    Chart #:[id number]    This will certify that I, the undersigned,    ______________________________________________________________________    A patient in the above named medical center, having requested discharge and removal from the medical center against the advice of my attending physician(s), hereby release [facility], its physicians, officers and employees, severally and individually, from any and all liability of any nature whatsoever for any injury or harm or complication of any kind that may result directly or indirectly, by reason of my terminating my stay as a patient at [facility] and my departure from [facility], and hereby waive any and all rights of action I may now have or later acquire as a result of my voluntary departure from [facility] and the termination of my stay as a patient therein.    This release is made with the full knowledge of the danger that may result from the action which I am taking.      Date:_______________________                         ___________________________                                                                                    Patient/Legal Representative    Witness:        ____________________________                          ___________________________  Nurse                                                                        Physician

## 2023-01-27 NOTE — ED PROVIDER NOTES
Encounter Date: 2023       History     Chief Complaint   Patient presents with    Contractions     Contractions x 2 days. LMP 8 months prior. No prenatal care. [id number]      at 8 months presents with contractions. States no pre  care because have been moving too much. States unsure of LMP but was 8 months ago. No chronic medical problems. Spontaneous vaginal deliveries w/o complications in the past, last 1 year ago    The history is provided by the patient.   Review of patient's allergies indicates:  No Known Allergies  Past Medical History:   Diagnosis Date    Anxiety     Depression     Drug abuse      History reviewed. No pertinent surgical history.  Family History   Problem Relation Age of Onset    Ovarian cancer Maternal Grandmother     Hypertension Father     Stroke Father     Colon cancer Neg Hx     Breast cancer Neg Hx      Social History     Tobacco Use    Smoking status: Every Day     Years: 15.00     Types: Cigarettes    Smokeless tobacco: Never   Substance Use Topics    Alcohol use: No    Drug use: Not Currently     Types: Methamphetamines     Review of Systems   Constitutional:  Negative for fever.   HENT:  Negative for sore throat.    Respiratory:  Negative for shortness of breath.    Cardiovascular:  Negative for chest pain.   Gastrointestinal:  Positive for abdominal pain. Negative for nausea.   Genitourinary:  Negative for dysuria.   Musculoskeletal:  Positive for back pain.   Skin:  Negative for rash.   Neurological:  Negative for weakness.   Hematological:  Does not bruise/bleed easily.   All other systems reviewed and are negative.    Physical Exam     Initial Vitals [23 1641]   BP Pulse Resp Temp SpO2   129/69 (!) 113 16 97.7 °F (36.5 °C) 100 %      MAP       --         Physical Exam    Nursing note and vitals reviewed.  Constitutional: She appears well-developed and well-nourished. No distress.   HENT:   Head: Normocephalic and atraumatic.   Mouth/Throat: Oropharynx is  clear and moist.   Eyes: Conjunctivae and EOM are normal. Pupils are equal, round, and reactive to light.   Neck: Neck supple.   Normal range of motion.  Cardiovascular:  Normal rate, regular rhythm, normal heart sounds and intact distal pulses.           Pulmonary/Chest: Breath sounds normal.   Abdominal: Abdomen is soft. Bowel sounds are normal. She exhibits no distension. There is no abdominal tenderness.   Gravid full There is no rebound and no guarding.   Genitourinary:    Vaginal discharge (white) present.      Genitourinary Comments: Cervix 2 cm, posterior     Musculoskeletal:         General: No edema. Normal range of motion.      Cervical back: Normal range of motion and neck supple.     Neurological: She is alert and oriented to person, place, and time. She has normal strength. GCS score is 15. GCS eye subscore is 4. GCS verbal subscore is 5. GCS motor subscore is 6.   Skin: Skin is warm and dry. No rash noted.   Psychiatric: Her behavior is normal.       ED Course   ED US Pelvis OB    Date/Time: 1/27/2023 5:10 PM  Performed by: Sebastian Galdamez MD  Authorized by: Sebastian Galdamez MD     Indication:  Pregnancy  Definitive IUP:  Present  Uterine Contents:  Fetus  (bpm):  150   32   4  Impression:  IUP  Labs Reviewed   COMPREHENSIVE METABOLIC PANEL - Abnormal; Notable for the following components:       Result Value    Carbon Dioxide 21 (*)     Blood Urea Nitrogen 4.8 (*)     Albumin Level 2.5 (*)     Globulin 4.5 (*)     Albumin/Globulin Ratio 0.6 (*)     All other components within normal limits   URINALYSIS - Abnormal; Notable for the following components:    Appearance, UA Turbid (*)     Bacteria, UA Few (*)     Squamous Epithelial Cells, UA Many (*)     Mucous, UA Trace (*)     All other components within normal limits   CBC WITH DIFFERENTIAL - Abnormal; Notable for the following components:    WBC 18.4 (*)     RBC 3.29 (*)     Hgb 9.2 (*)     Hct 29.5 (*)     MCHC 31.2 (*)      Platelet 409 (*)     MPV 10.5 (*)     IG# 1.66 (*)     All other components within normal limits   MANUAL DIFFERENTIAL - Abnormal; Notable for the following components:    Lymph Man 12 (*)     Abs Neut calc 14.352 (*)     RBC Morph Abnormal (*)     Anisocyte 1+ (*)     Platelet Est Increased (*)     All other components within normal limits   TSH - Normal   CHLAMYDIA/GONORRHOEAE(GC), PCR   CBC W/ AUTO DIFFERENTIAL    Narrative:     The following orders were created for panel order CBC auto differential.  Procedure                               Abnormality         Status                     ---------                               -----------         ------                     CBC with Differential[[id number]]        Abnormal            Final result               Manual Differential[[id number]]          Abnormal            Final result                 Please view results for these tests on the individual orders.   EXTRA TUBES    Narrative:     The following orders were created for panel order EXTRA TUBES.  Procedure                               Abnormality         Status                     ---------                               -----------         ------                     Light Blue Top Hold[[id number]]                              In process                 Gold Top Hold[[id number]]                                    In process                 Pink Top Hold[[id number]]                                    In process                   Please view results for these tests on the individual orders.   LIGHT BLUE TOP HOLD   GOLD TOP HOLD   PINK TOP HOLD          Imaging Results    None          Medications   lactated ringers infusion (1,000 mLs Intravenous New Bag 1/27/23 1700)                       5:33 PM     At this time pt stable, irregular contractions with adequate fetal HR variability. Due to no OB service in this hospital will transfer for OB care.    Pt Nery Stephanie have a diagnosis of third trimester pregnancy  with contractions requires evaluation and management by OB service. At this facility we do not have OB capability for which will need to transfer to a facility with capability and capacity. Pt was informed about his condition and the recommendation of transfer for specialty care, Pt understood and agrees with transfer recommendation. This case was discuss [place]. [place] and [facility] who accepted the patient for transfer and assures capacity and capability for this emergency department case. Pt will be transfer by ([facility]) by  ground using an ACLS unit. Treatment in route will be RL at 100 ml/hr, cardiac monitor.  The risk of transfer are Motor Vehicle Accident, Respiratory Failure, Cardiac Dysrhythmias, Progression of delivery,fetal death or maternal death.  The benefits of the transfer are adequate evaluation and management by a specialist in the area of OB.  At this time Pt is stable for transfer.       6:46 PM    At this time pt is requesting discharge. Have been waiting for EMS for transfer for OB management. No signs of active labor. Pt is aware of the possible consequences of leaving against medical advice, she is competent and stable.     This patient is choosing to leave against medical advice.  I has personally explained to her that choosing to do so may result in permanent bodily harm or death.  The EP discussed at great length that without further evaluation and monitoring there may be unforeseen circumstances and/or deterioration causing permanent bodily harm or death as a result of her choice.  She verbalized these risks back to the physician in laymans terms.  She is alert, oriented, and shows the mental capacity to make clear decisions regarding her health care at this time. She continues to wish to leave against medical advice.     In light of her decision to leave AMA, follow-up has been arranged and she is aware of the importance of following up as  instructed.  She has been advised that she should return to the ED immediately if she changes her mind at any time, or if her condition begins to change or worsen in any way.        Clinical Impression:   Final diagnoses:  [O47.9] Uterine contractions during pregnancy (Primary)        ED Disposition Condition    Transfer to Another Facility Stable                Sebastian Galdamez MD  01/27/23 1738       Sebastian Galdamez MD  01/27/23 1806       Sebastian Galdamez MD  01/27/23 9871

## 2023-01-28 NOTE — ED NOTES
Pt requested nurse come into room. Upon entering room, pt was trying to take IV out and stated that she wanted to go home. She did not want to be transferred or be in the hospital anymore. Pt educated on medical condition and risks of not receiving the medical treatments she needs. Pt verbally stated understanding and insisted on leaving hospital. Pt signed AMA form, MD and charge nurse made aware and witnessed. All medical equipment removed and pt was escorted to vehicle via wheelchair. Pt ambulatory, VSS, GCS 15,  upon leaving ER.

## 2023-11-27 ENCOUNTER — HOSPITAL ENCOUNTER (OUTPATIENT)
Facility: HOSPITAL | Age: 38
Discharge: OTHER INSTITUTION | End: 2023-11-28
Attending: STUDENT IN AN ORGANIZED HEALTH CARE EDUCATION/TRAINING PROGRAM | Admitting: STUDENT IN AN ORGANIZED HEALTH CARE EDUCATION/TRAINING PROGRAM
Payer: MEDICAID

## 2023-11-27 VITALS
WEIGHT: 149 LBS | HEIGHT: 66 IN | HEART RATE: 107 BPM | DIASTOLIC BLOOD PRESSURE: 66 MMHG | RESPIRATION RATE: 18 BRPM | OXYGEN SATURATION: 99 % | TEMPERATURE: 98 F | BODY MASS INDEX: 23.95 KG/M2 | SYSTOLIC BLOOD PRESSURE: 125 MMHG

## 2023-11-27 DIAGNOSIS — O09.893 HISTORY OF PRETERM DELIVERY, CURRENTLY PREGNANT IN THIRD TRIMESTER: Primary | ICD-10-CM

## 2023-11-27 DIAGNOSIS — O42.90 PREMATURE RUPTURE OF MEMBRANES: ICD-10-CM

## 2023-11-27 PROBLEM — O42.919 PRETERM PREMATURE RUPTURE OF MEMBRANES: Status: ACTIVE | Noted: 2023-11-27

## 2023-11-27 PROBLEM — F15.91 HISTORY OF METHAMPHETAMINE USE: Status: ACTIVE | Noted: 2023-11-27

## 2023-11-27 LAB
ABO + RH BLD: NORMAL
AMPHET+METHAMPHET UR QL: ABNORMAL
BACTERIA #/AREA URNS HPF: ABNORMAL /HPF
BARBITURATES UR QL SCN>200 NG/ML: NEGATIVE
BASOPHILS # BLD AUTO: 0.11 K/UL (ref 0–0.2)
BASOPHILS NFR BLD: 0.7 % (ref 0–1.9)
BENZODIAZ UR QL SCN>200 NG/ML: NEGATIVE
BILIRUB UR QL STRIP: NEGATIVE
BLD GP AB SCN CELLS X3 SERPL QL: NORMAL
BZE UR QL SCN: NEGATIVE
CANNABINOIDS UR QL SCN: NEGATIVE
CLARITY UR: CLEAR
COLOR UR: ABNORMAL
CREAT UR-MCNC: 27.6 MG/DL (ref 15–325)
DIFFERENTIAL METHOD: ABNORMAL
EOSINOPHIL # BLD AUTO: 0.4 K/UL (ref 0–0.5)
EOSINOPHIL NFR BLD: 2.3 % (ref 0–8)
ERYTHROCYTE [DISTWIDTH] IN BLOOD BY AUTOMATED COUNT: 15.6 % (ref 11.5–14.5)
GLUCOSE UR QL STRIP: NEGATIVE
HCT VFR BLD AUTO: 29.4 % (ref 37–48.5)
HGB BLD-MCNC: 9 G/DL (ref 12–16)
HGB UR QL STRIP: ABNORMAL
HIV1+2 IGG SERPL QL IA.RAPID: NORMAL
HYALINE CASTS #/AREA URNS LPF: 0 /LPF
IMM GRANULOCYTES # BLD AUTO: 0.35 K/UL (ref 0–0.04)
IMM GRANULOCYTES NFR BLD AUTO: 2.2 % (ref 0–0.5)
KETONES UR QL STRIP: NEGATIVE
LEUKOCYTE ESTERASE UR QL STRIP: NEGATIVE
LYMPHOCYTES # BLD AUTO: 1.9 K/UL (ref 1–4.8)
LYMPHOCYTES NFR BLD: 11.9 % (ref 18–48)
MCH RBC QN AUTO: 26.1 PG (ref 27–31)
MCHC RBC AUTO-ENTMCNC: 30.6 G/DL (ref 32–36)
MCV RBC AUTO: 85 FL (ref 82–98)
METHADONE UR QL SCN>300 NG/ML: NEGATIVE
MICROSCOPIC COMMENT: ABNORMAL
MONOCYTES # BLD AUTO: 1.1 K/UL (ref 0.3–1)
MONOCYTES NFR BLD: 6.7 % (ref 4–15)
NEUTROPHILS # BLD AUTO: 12.4 K/UL (ref 1.8–7.7)
NEUTROPHILS NFR BLD: 76.2 % (ref 38–73)
NITRITE UR QL STRIP: NEGATIVE
NRBC BLD-RTO: 0 /100 WBC
OPIATES UR QL SCN: NEGATIVE
PCP UR QL SCN>25 NG/ML: NEGATIVE
PH UR STRIP: 7 [PH] (ref 5–8)
PLATELET # BLD AUTO: 410 K/UL (ref 150–450)
PMV BLD AUTO: 9.9 FL (ref 9.2–12.9)
PROT UR QL STRIP: ABNORMAL
RBC # BLD AUTO: 3.45 M/UL (ref 4–5.4)
RBC #/AREA URNS HPF: 50 /HPF (ref 0–4)
RUPTURE OF MEMBRANE: POSITIVE
SP GR UR STRIP: 1.01 (ref 1–1.03)
SPECIMEN OUTDATE: NORMAL
SQUAMOUS #/AREA URNS HPF: 1 /HPF
TOXICOLOGY INFORMATION: ABNORMAL
URN SPEC COLLECT METH UR: ABNORMAL
UROBILINOGEN UR STRIP-ACNC: NEGATIVE EU/DL
WBC # BLD AUTO: 16.19 K/UL (ref 3.9–12.7)
WBC #/AREA URNS HPF: 1 /HPF (ref 0–5)

## 2023-11-27 PROCEDURE — 59025 FETAL NON-STRESS TEST: CPT

## 2023-11-27 PROCEDURE — 63600175 PHARM REV CODE 636 W HCPCS: Performed by: STUDENT IN AN ORGANIZED HEALTH CARE EDUCATION/TRAINING PROGRAM

## 2023-11-27 PROCEDURE — 99211 OFF/OP EST MAY X REQ PHY/QHP: CPT | Mod: 25

## 2023-11-27 PROCEDURE — 86762 RUBELLA ANTIBODY: CPT | Performed by: STUDENT IN AN ORGANIZED HEALTH CARE EDUCATION/TRAINING PROGRAM

## 2023-11-27 PROCEDURE — 86703 HIV-1/HIV-2 1 RESULT ANTBDY: CPT | Performed by: STUDENT IN AN ORGANIZED HEALTH CARE EDUCATION/TRAINING PROGRAM

## 2023-11-27 PROCEDURE — 80307 DRUG TEST PRSMV CHEM ANLYZR: CPT | Performed by: STUDENT IN AN ORGANIZED HEALTH CARE EDUCATION/TRAINING PROGRAM

## 2023-11-27 PROCEDURE — 81000 URINALYSIS NONAUTO W/SCOPE: CPT | Performed by: STUDENT IN AN ORGANIZED HEALTH CARE EDUCATION/TRAINING PROGRAM

## 2023-11-27 PROCEDURE — 87086 URINE CULTURE/COLONY COUNT: CPT | Performed by: STUDENT IN AN ORGANIZED HEALTH CARE EDUCATION/TRAINING PROGRAM

## 2023-11-27 PROCEDURE — 63600175 PHARM REV CODE 636 W HCPCS

## 2023-11-27 PROCEDURE — 85025 COMPLETE CBC W/AUTO DIFF WBC: CPT | Performed by: STUDENT IN AN ORGANIZED HEALTH CARE EDUCATION/TRAINING PROGRAM

## 2023-11-27 PROCEDURE — 87081 CULTURE SCREEN ONLY: CPT | Performed by: STUDENT IN AN ORGANIZED HEALTH CARE EDUCATION/TRAINING PROGRAM

## 2023-11-27 PROCEDURE — 86592 SYPHILIS TEST NON-TREP QUAL: CPT | Performed by: STUDENT IN AN ORGANIZED HEALTH CARE EDUCATION/TRAINING PROGRAM

## 2023-11-27 PROCEDURE — 84112 EVAL AMNIOTIC FLUID PROTEIN: CPT | Performed by: STUDENT IN AN ORGANIZED HEALTH CARE EDUCATION/TRAINING PROGRAM

## 2023-11-27 PROCEDURE — 87389 HIV-1 AG W/HIV-1&-2 AB AG IA: CPT | Performed by: STUDENT IN AN ORGANIZED HEALTH CARE EDUCATION/TRAINING PROGRAM

## 2023-11-27 PROCEDURE — 86901 BLOOD TYPING SEROLOGIC RH(D): CPT | Performed by: STUDENT IN AN ORGANIZED HEALTH CARE EDUCATION/TRAINING PROGRAM

## 2023-11-27 RX ORDER — CALCIUM CARBONATE 200(500)MG
500 TABLET,CHEWABLE ORAL 3 TIMES DAILY PRN
Status: DISCONTINUED | OUTPATIENT
Start: 2023-11-28 | End: 2023-11-28 | Stop reason: HOSPADM

## 2023-11-27 RX ORDER — ACETAMINOPHEN 500 MG
500 TABLET ORAL EVERY 6 HOURS PRN
Status: DISCONTINUED | OUTPATIENT
Start: 2023-11-27 | End: 2023-11-28 | Stop reason: HOSPADM

## 2023-11-27 RX ORDER — CALCIUM GLUCONATE 98 MG/ML
1 INJECTION, SOLUTION INTRAVENOUS
Status: DISCONTINUED | OUTPATIENT
Start: 2023-11-28 | End: 2023-11-28 | Stop reason: HOSPADM

## 2023-11-27 RX ORDER — BETAMETHASONE SODIUM PHOSPHATE AND BETAMETHASONE ACETATE 3; 3 MG/ML; MG/ML
12 INJECTION, SUSPENSION INTRA-ARTICULAR; INTRALESIONAL; INTRAMUSCULAR; SOFT TISSUE
Status: DISCONTINUED | OUTPATIENT
Start: 2023-11-27 | End: 2023-11-28 | Stop reason: HOSPADM

## 2023-11-27 RX ORDER — BETAMETHASONE SODIUM PHOSPHATE AND BETAMETHASONE ACETATE 3; 3 MG/ML; MG/ML
12 INJECTION, SUSPENSION INTRA-ARTICULAR; INTRALESIONAL; INTRAMUSCULAR; SOFT TISSUE EVERY 24 HOURS
Status: DISCONTINUED | OUTPATIENT
Start: 2023-11-28 | End: 2023-11-27

## 2023-11-27 RX ORDER — CALCIUM GLUCONATE 98 MG/ML
INJECTION, SOLUTION INTRAVENOUS
Status: DISCONTINUED
Start: 2023-11-27 | End: 2023-11-28 | Stop reason: HOSPADM

## 2023-11-27 RX ORDER — AZITHROMYCIN 250 MG/1
1000 TABLET, FILM COATED ORAL ONCE
Status: COMPLETED | OUTPATIENT
Start: 2023-11-28 | End: 2023-11-27

## 2023-11-27 RX ORDER — PROCHLORPERAZINE EDISYLATE 5 MG/ML
5 INJECTION INTRAMUSCULAR; INTRAVENOUS EVERY 6 HOURS PRN
Status: DISCONTINUED | OUTPATIENT
Start: 2023-11-27 | End: 2023-11-28 | Stop reason: HOSPADM

## 2023-11-27 RX ORDER — ONDANSETRON 8 MG/1
8 TABLET, ORALLY DISINTEGRATING ORAL EVERY 8 HOURS PRN
Status: DISCONTINUED | OUTPATIENT
Start: 2023-11-27 | End: 2023-11-28 | Stop reason: HOSPADM

## 2023-11-27 RX ORDER — MAGNESIUM SULFATE HEPTAHYDRATE 40 MG/ML
6 INJECTION, SOLUTION INTRAVENOUS ONCE
Status: COMPLETED | OUTPATIENT
Start: 2023-11-28 | End: 2023-11-27

## 2023-11-27 RX ORDER — MAGNESIUM SULFATE HEPTAHYDRATE 40 MG/ML
2 INJECTION, SOLUTION INTRAVENOUS CONTINUOUS
Status: DISCONTINUED | OUTPATIENT
Start: 2023-11-28 | End: 2023-11-28 | Stop reason: HOSPADM

## 2023-11-27 RX ORDER — SODIUM CHLORIDE, SODIUM LACTATE, POTASSIUM CHLORIDE, CALCIUM CHLORIDE 600; 310; 30; 20 MG/100ML; MG/100ML; MG/100ML; MG/100ML
INJECTION, SOLUTION INTRAVENOUS CONTINUOUS
Status: DISCONTINUED | OUTPATIENT
Start: 2023-11-27 | End: 2023-11-28 | Stop reason: HOSPADM

## 2023-11-27 RX ORDER — ONDANSETRON 2 MG/ML
INJECTION INTRAMUSCULAR; INTRAVENOUS
Status: COMPLETED
Start: 2023-11-27 | End: 2023-11-27

## 2023-11-27 RX ORDER — SODIUM CHLORIDE 0.9 % (FLUSH) 0.9 %
10 SYRINGE (ML) INJECTION
Status: DISCONTINUED | OUTPATIENT
Start: 2023-11-27 | End: 2023-11-28 | Stop reason: HOSPADM

## 2023-11-27 RX ORDER — ONDANSETRON 2 MG/ML
4 INJECTION INTRAMUSCULAR; INTRAVENOUS EVERY 6 HOURS PRN
Status: DISCONTINUED | OUTPATIENT
Start: 2023-11-28 | End: 2023-11-28 | Stop reason: HOSPADM

## 2023-11-27 RX ADMIN — ONDANSETRON 4 MG: 2 INJECTION INTRAMUSCULAR; INTRAVENOUS at 11:11

## 2023-11-27 RX ADMIN — SODIUM CHLORIDE, POTASSIUM CHLORIDE, SODIUM LACTATE AND CALCIUM CHLORIDE: 600; 310; 30; 20 INJECTION, SOLUTION INTRAVENOUS at 10:11

## 2023-11-27 RX ADMIN — BETAMETHASONE SODIUM PHOSPHATE AND BETAMETHASONE ACETATE 12 MG: 3; 3 INJECTION, SUSPENSION INTRA-ARTICULAR; INTRALESIONAL; INTRAMUSCULAR at 11:11

## 2023-11-27 RX ADMIN — AZITHROMYCIN MONOHYDRATE 1000 MG: 250 TABLET ORAL at 11:11

## 2023-11-27 RX ADMIN — CALCIUM CARBONATE (ANTACID) CHEW TAB 500 MG 500 MG: 500 CHEW TAB at 11:11

## 2023-11-27 RX ADMIN — AMPICILLIN SODIUM 2 G: 2 INJECTION, POWDER, FOR SOLUTION INTRAMUSCULAR; INTRAVENOUS at 11:11

## 2023-11-27 RX ADMIN — MAGNESIUM SULFATE HEPTAHYDRATE 6 G: 40 INJECTION, SOLUTION INTRAVENOUS at 11:11

## 2023-11-28 ENCOUNTER — ANESTHESIA (OUTPATIENT)
Dept: OBSTETRICS AND GYNECOLOGY | Facility: OTHER | Age: 38
End: 2023-11-28
Payer: MEDICAID

## 2023-11-28 ENCOUNTER — ANESTHESIA EVENT (OUTPATIENT)
Dept: OBSTETRICS AND GYNECOLOGY | Facility: OTHER | Age: 38
End: 2023-11-28
Payer: MEDICAID

## 2023-11-28 ENCOUNTER — HOSPITAL ENCOUNTER (INPATIENT)
Facility: OTHER | Age: 38
LOS: 6 days | Discharge: HOME | End: 2023-12-04
Attending: OBSTETRICS & GYNECOLOGY | Admitting: OBSTETRICS & GYNECOLOGY
Payer: MEDICAID

## 2023-11-28 DIAGNOSIS — Z3A.29 29 WEEKS GESTATION OF PREGNANCY: ICD-10-CM

## 2023-11-28 DIAGNOSIS — F15.10 AMPHETAMINE ABUSE: ICD-10-CM

## 2023-11-28 DIAGNOSIS — Z3A.28 28 WEEKS GESTATION OF PREGNANCY: ICD-10-CM

## 2023-11-28 DIAGNOSIS — O09.33 NO PRENATAL CARE IN CURRENT PREGNANCY IN THIRD TRIMESTER: ICD-10-CM

## 2023-11-28 DIAGNOSIS — O26.899 RH NEGATIVE STATE IN ANTEPARTUM PERIOD: ICD-10-CM

## 2023-11-28 DIAGNOSIS — O42.919 PRETERM PREMATURE RUPTURE OF MEMBRANES (PPROM) WITH UNKNOWN ONSET OF LABOR: ICD-10-CM

## 2023-11-28 DIAGNOSIS — O99.019 ANEMIA AFFECTING 10TH PREGNANCY: ICD-10-CM

## 2023-11-28 DIAGNOSIS — Z60.9 POOR SOCIAL SITUATION: ICD-10-CM

## 2023-11-28 DIAGNOSIS — Z32.01 PREGNANCY EXAMINATION OR TEST, POSITIVE RESULT: ICD-10-CM

## 2023-11-28 DIAGNOSIS — Z98.891 STATUS POST PRIMARY LOW TRANSVERSE CESAREAN SECTION: Primary | ICD-10-CM

## 2023-11-28 DIAGNOSIS — Z67.91 RH NEGATIVE STATE IN ANTEPARTUM PERIOD: ICD-10-CM

## 2023-11-28 DIAGNOSIS — O09.40 ANEMIA AFFECTING 10TH PREGNANCY: ICD-10-CM

## 2023-11-28 PROBLEM — O13.9 GESTATIONAL HYPERTENSION: Status: ACTIVE | Noted: 2023-11-28

## 2023-11-28 PROBLEM — Z30.09 UNWANTED FERTILITY: Status: ACTIVE | Noted: 2023-11-28

## 2023-11-28 LAB
ABO + RH BLD: NORMAL
ALBUMIN SERPL BCP-MCNC: 2.3 G/DL (ref 3.5–5.2)
ALP SERPL-CCNC: 106 U/L (ref 55–135)
ALT SERPL W/O P-5'-P-CCNC: 16 U/L (ref 10–44)
ANION GAP SERPL CALC-SCNC: 9 MMOL/L (ref 8–16)
AST SERPL-CCNC: 24 U/L (ref 10–40)
BILIRUB SERPL-MCNC: 0.1 MG/DL (ref 0.1–1)
BLD GP AB SCN CELLS X3 SERPL QL: NORMAL
BUN SERPL-MCNC: 8 MG/DL (ref 6–20)
CALCIUM SERPL-MCNC: 8.4 MG/DL (ref 8.7–10.5)
CHLORIDE SERPL-SCNC: 106 MMOL/L (ref 95–110)
CO2 SERPL-SCNC: 19 MMOL/L (ref 23–29)
CREAT SERPL-MCNC: 0.6 MG/DL (ref 0.5–1.4)
EST. GFR  (NO RACE VARIABLE): >60 ML/MIN/1.73 M^2
FETAL CELL SCN BLD QL ROSETTE: NORMAL
GLUCOSE SERPL-MCNC: 103 MG/DL (ref 70–110)
HBV SURFACE AG SERPL QL IA: NORMAL
HIV 1+2 AB+HIV1 P24 AG SERPL QL IA: NORMAL
INJECT RH IG VOL PATIENT: NORMAL ML
POTASSIUM SERPL-SCNC: 4 MMOL/L (ref 3.5–5.1)
PROT SERPL-MCNC: 6.7 G/DL (ref 6–8.4)
RPR SER QL: NORMAL
RUBV IGG SER-ACNC: 8.2 IU/ML
RUBV IGG SER-IMP: ABNORMAL
SODIUM SERPL-SCNC: 134 MMOL/L (ref 136–145)
SPECIMEN OUTDATE: NORMAL

## 2023-11-28 PROCEDURE — 76811 OB US DETAILED SNGL FETUS: CPT

## 2023-11-28 PROCEDURE — 25000003 PHARM REV CODE 250

## 2023-11-28 PROCEDURE — 76811 PR US, OB FETAL EVAL & EXAM, TRANSABDOM,FIRST GESTATION: ICD-10-PCS | Mod: 26,,, | Performed by: OBSTETRICS & GYNECOLOGY

## 2023-11-28 PROCEDURE — 11000001 HC ACUTE MED/SURG PRIVATE ROOM

## 2023-11-28 PROCEDURE — 87491 CHLMYD TRACH DNA AMP PROBE: CPT | Performed by: STUDENT IN AN ORGANIZED HEALTH CARE EDUCATION/TRAINING PROGRAM

## 2023-11-28 PROCEDURE — 86920 COMPATIBILITY TEST SPIN: CPT | Performed by: STUDENT IN AN ORGANIZED HEALTH CARE EDUCATION/TRAINING PROGRAM

## 2023-11-28 PROCEDURE — 99285 EMERGENCY DEPT VISIT HI MDM: CPT | Mod: 25,,, | Performed by: OBSTETRICS & GYNECOLOGY

## 2023-11-28 PROCEDURE — 85461 HEMOGLOBIN FETAL: CPT | Performed by: GENERAL PRACTICE

## 2023-11-28 PROCEDURE — 63600175 PHARM REV CODE 636 W HCPCS: Performed by: STUDENT IN AN ORGANIZED HEALTH CARE EDUCATION/TRAINING PROGRAM

## 2023-11-28 PROCEDURE — 87340 HEPATITIS B SURFACE AG IA: CPT | Performed by: STUDENT IN AN ORGANIZED HEALTH CARE EDUCATION/TRAINING PROGRAM

## 2023-11-28 PROCEDURE — 25000003 PHARM REV CODE 250: Performed by: GENERAL PRACTICE

## 2023-11-28 PROCEDURE — 59025 FETAL NON-STRESS TEST: CPT

## 2023-11-28 PROCEDURE — 63600175 PHARM REV CODE 636 W HCPCS: Performed by: GENERAL PRACTICE

## 2023-11-28 PROCEDURE — 99285 PR EMERGENCY DEPT VISIT,LEVEL V: ICD-10-PCS | Mod: 25,,, | Performed by: OBSTETRICS & GYNECOLOGY

## 2023-11-28 PROCEDURE — 59025 FETAL NON-STRESS TEST: CPT | Mod: 26,,, | Performed by: OBSTETRICS & GYNECOLOGY

## 2023-11-28 PROCEDURE — 63600519 RHOGAM PHARM REV CODE 636 ALT 250 W HCPCS: Performed by: STUDENT IN AN ORGANIZED HEALTH CARE EDUCATION/TRAINING PROGRAM

## 2023-11-28 PROCEDURE — 59025 PR FETAL 2N-STRESS TEST: ICD-10-PCS | Mod: 26,,, | Performed by: OBSTETRICS & GYNECOLOGY

## 2023-11-28 PROCEDURE — 99222 1ST HOSP IP/OBS MODERATE 55: CPT | Mod: 25,,, | Performed by: OBSTETRICS & GYNECOLOGY

## 2023-11-28 PROCEDURE — 63700000 PHARM REV CODE 250 ALT 637 W/O HCPCS: Performed by: STUDENT IN AN ORGANIZED HEALTH CARE EDUCATION/TRAINING PROGRAM

## 2023-11-28 PROCEDURE — 86850 RBC ANTIBODY SCREEN: CPT | Performed by: STUDENT IN AN ORGANIZED HEALTH CARE EDUCATION/TRAINING PROGRAM

## 2023-11-28 PROCEDURE — 87081 CULTURE SCREEN ONLY: CPT

## 2023-11-28 PROCEDURE — 99222 PR INITIAL HOSPITAL CARE,LEVL II: ICD-10-PCS | Mod: 25,,, | Performed by: OBSTETRICS & GYNECOLOGY

## 2023-11-28 PROCEDURE — 81514 NFCT DS BV&VAGINITIS DNA ALG: CPT

## 2023-11-28 PROCEDURE — 25000003 PHARM REV CODE 250: Performed by: STUDENT IN AN ORGANIZED HEALTH CARE EDUCATION/TRAINING PROGRAM

## 2023-11-28 PROCEDURE — 99285 EMERGENCY DEPT VISIT HI MDM: CPT | Mod: 25

## 2023-11-28 PROCEDURE — 76811 OB US DETAILED SNGL FETUS: CPT | Mod: 26,,, | Performed by: OBSTETRICS & GYNECOLOGY

## 2023-11-28 PROCEDURE — 80053 COMPREHEN METABOLIC PANEL: CPT | Performed by: STUDENT IN AN ORGANIZED HEALTH CARE EDUCATION/TRAINING PROGRAM

## 2023-11-28 RX ORDER — AZITHROMYCIN 250 MG/1
1000 TABLET, FILM COATED ORAL DAILY
Status: DISCONTINUED | OUTPATIENT
Start: 2023-12-01 | End: 2023-12-01

## 2023-11-28 RX ORDER — LANOLIN ALCOHOL/MO/W.PET/CERES
1 CREAM (GRAM) TOPICAL DAILY
Status: DISCONTINUED | OUTPATIENT
Start: 2023-11-28 | End: 2023-12-04 | Stop reason: HOSPADM

## 2023-11-28 RX ORDER — AMOXICILLIN 250 MG/1
500 CAPSULE ORAL EVERY 8 HOURS
Status: DISCONTINUED | OUTPATIENT
Start: 2023-11-30 | End: 2023-12-01

## 2023-11-28 RX ORDER — MAGNESIUM SULFATE HEPTAHYDRATE 40 MG/ML
2 INJECTION, SOLUTION INTRAVENOUS CONTINUOUS
Status: DISCONTINUED | OUTPATIENT
Start: 2023-11-28 | End: 2023-11-28

## 2023-11-28 RX ORDER — CALCIUM GLUCONATE 98 MG/ML
1 INJECTION, SOLUTION INTRAVENOUS
Status: DISCONTINUED | OUTPATIENT
Start: 2023-11-28 | End: 2023-12-02

## 2023-11-28 RX ORDER — DIPHENHYDRAMINE HCL 25 MG
25 CAPSULE ORAL EVERY 4 HOURS PRN
Status: DISCONTINUED | OUTPATIENT
Start: 2023-11-28 | End: 2023-12-02

## 2023-11-28 RX ORDER — SODIUM CHLORIDE, SODIUM LACTATE, POTASSIUM CHLORIDE, CALCIUM CHLORIDE 600; 310; 30; 20 MG/100ML; MG/100ML; MG/100ML; MG/100ML
INJECTION, SOLUTION INTRAVENOUS CONTINUOUS
Status: DISCONTINUED | OUTPATIENT
Start: 2023-11-28 | End: 2023-11-28

## 2023-11-28 RX ORDER — SODIUM CHLORIDE 0.9 % (FLUSH) 0.9 %
10 SYRINGE (ML) INJECTION
Status: DISCONTINUED | OUTPATIENT
Start: 2023-11-28 | End: 2023-12-02

## 2023-11-28 RX ORDER — SIMETHICONE 80 MG
1 TABLET,CHEWABLE ORAL EVERY 6 HOURS PRN
Status: DISCONTINUED | OUTPATIENT
Start: 2023-11-28 | End: 2023-12-02

## 2023-11-28 RX ORDER — AMOXICILLIN 250 MG/1
500 CAPSULE ORAL EVERY 8 HOURS
Status: DISCONTINUED | OUTPATIENT
Start: 2023-11-30 | End: 2023-11-28

## 2023-11-28 RX ORDER — HYDROCODONE BITARTRATE AND ACETAMINOPHEN 500; 5 MG/1; MG/1
TABLET ORAL
Status: DISCONTINUED | OUTPATIENT
Start: 2023-11-28 | End: 2023-12-02

## 2023-11-28 RX ORDER — ACETAMINOPHEN 325 MG/1
650 TABLET ORAL EVERY 6 HOURS PRN
Status: DISCONTINUED | OUTPATIENT
Start: 2023-11-28 | End: 2023-12-02

## 2023-11-28 RX ORDER — DEXAMETHASONE SODIUM PHOSPHATE 4 MG/ML
6 INJECTION, SOLUTION INTRA-ARTICULAR; INTRALESIONAL; INTRAMUSCULAR; INTRAVENOUS; SOFT TISSUE EVERY 12 HOURS
Status: DISCONTINUED | OUTPATIENT
Start: 2023-11-28 | End: 2023-11-28

## 2023-11-28 RX ORDER — AMOXICILLIN 250 MG
1 CAPSULE ORAL NIGHTLY PRN
Status: DISCONTINUED | OUTPATIENT
Start: 2023-11-28 | End: 2023-12-01

## 2023-11-28 RX ORDER — DEXAMETHASONE SODIUM PHOSPHATE 4 MG/ML
6 INJECTION, SOLUTION INTRA-ARTICULAR; INTRALESIONAL; INTRAMUSCULAR; INTRAVENOUS; SOFT TISSUE EVERY 12 HOURS
Status: COMPLETED | OUTPATIENT
Start: 2023-11-28 | End: 2023-11-29

## 2023-11-28 RX ORDER — DIPHENHYDRAMINE HYDROCHLORIDE 50 MG/ML
25 INJECTION INTRAMUSCULAR; INTRAVENOUS EVERY 4 HOURS PRN
Status: DISCONTINUED | OUTPATIENT
Start: 2023-11-28 | End: 2023-12-02

## 2023-11-28 RX ORDER — ONDANSETRON 8 MG/1
8 TABLET, ORALLY DISINTEGRATING ORAL EVERY 8 HOURS PRN
Status: DISCONTINUED | OUTPATIENT
Start: 2023-11-28 | End: 2023-12-01

## 2023-11-28 RX ORDER — PRENATAL WITH FERROUS FUM AND FOLIC ACID 3080; 920; 120; 400; 22; 1.84; 3; 20; 10; 1; 12; 200; 27; 25; 2 [IU]/1; [IU]/1; MG/1; [IU]/1; MG/1; MG/1; MG/1; MG/1; MG/1; MG/1; UG/1; MG/1; MG/1; MG/1; MG/1
1 TABLET ORAL DAILY
Status: DISCONTINUED | OUTPATIENT
Start: 2023-11-28 | End: 2023-12-02

## 2023-11-28 RX ORDER — PROCHLORPERAZINE EDISYLATE 5 MG/ML
5 INJECTION INTRAMUSCULAR; INTRAVENOUS EVERY 6 HOURS PRN
Status: DISCONTINUED | OUTPATIENT
Start: 2023-11-28 | End: 2023-12-02

## 2023-11-28 RX ORDER — AMOXICILLIN 250 MG/1
500 CAPSULE ORAL EVERY 8 HOURS
Status: DISCONTINUED | OUTPATIENT
Start: 2023-11-29 | End: 2023-11-28

## 2023-11-28 RX ADMIN — HUMAN RHO(D) IMMUNE GLOBULIN 300 MCG: 300 INJECTION, SOLUTION INTRAMUSCULAR at 04:11

## 2023-11-28 RX ADMIN — AMPICILLIN SODIUM 2 G: 2 INJECTION, POWDER, FOR SOLUTION INTRAVENOUS at 11:11

## 2023-11-28 RX ADMIN — AMPICILLIN SODIUM 2 G: 2 INJECTION, POWDER, FOR SOLUTION INTRAVENOUS at 10:11

## 2023-11-28 RX ADMIN — AMPICILLIN SODIUM 2 G: 2 INJECTION, POWDER, FOR SOLUTION INTRAVENOUS at 06:11

## 2023-11-28 RX ADMIN — DEXAMETHASONE SODIUM PHOSPHATE 6 MG: 4 INJECTION INTRA-ARTICULAR; INTRALESIONAL; INTRAMUSCULAR; INTRAVENOUS; SOFT TISSUE at 10:11

## 2023-11-28 RX ADMIN — PRENATAL VIT W/ FE FUMARATE-FA TAB 27-0.8 MG 1 TABLET: 27-0.8 TAB at 09:11

## 2023-11-28 RX ADMIN — FERROUS SULFATE TAB 325 MG (65 MG ELEMENTAL FE) 1 EACH: 325 (65 FE) TAB at 09:11

## 2023-11-28 RX ADMIN — SODIUM CHLORIDE, POTASSIUM CHLORIDE, SODIUM LACTATE AND CALCIUM CHLORIDE: 600; 310; 30; 20 INJECTION, SOLUTION INTRAVENOUS at 04:11

## 2023-11-28 RX ADMIN — AMPICILLIN SODIUM 2 G: 2 INJECTION, POWDER, FOR SOLUTION INTRAVENOUS at 04:11

## 2023-11-28 SDOH — SOCIAL DETERMINANTS OF HEALTH (SDOH): PROBLEM RELATED TO SOCIAL ENVIRONMENT, UNSPECIFIED: Z60.9

## 2023-11-28 NOTE — HPI
Patient is a 39 yo  at unknown gestational age presenting this evening with concern for rupture of membranes. She reports that she felt a gush of clear fluid around 8 pm. She has had no prenatal care during this pregnancy. She recently had a baby in 2023 and her period had not returned. She is a very poor historian but reports that she has had 9 prior pregnancies with two  deliveries and one prior elective .     BSUS 23  EXAMINATION:  US OB LIMITED 1 OR MORE GESTATIONS     CLINICAL HISTORY:  unknown gestation;     TECHNIQUE:  Transabdominal 2nd or 3rd trimester obstetrical ultrasound was performed.     COMPARISON:  None.     FINDINGS:  There is a single, cephalic, intrauterine gestation with a combined biometric derived estimated gestational age of 28 weeks and 6 days and an SOO of 2024.  Estimated fetal weight is 2 pounds and 14 ounces with a Hadlock percentile of 37%.  The individual biometric parameters are as follows:     BPD: 7.01 cm     Head circumference: 26.97 cm     Abdominal circumference: 25.55 cm     Femur length: 5.17 cm     Amniotic fluid volume is within normal limits with maximum vertical pocket measuring 4.7 cm.  Fetal cardiac activity is seen with a rate of 135 beats per minute on M-mode imaging.     The cervix appears closed and there is no evidence placenta previa.  The placenta is posterior.     Impression:     Single, live, intrauterine gestation with a combine biometric derived estimated gestational age of 28 weeks and 6 days and an SOO of 2024.     Amniotic fluid volume is within normal limits and there is no evidence of placenta previa.

## 2023-11-28 NOTE — H&P
[facility] - Labor & Delivery  Obstetrics  History & Physical    Patient Name: Nery Brown  MRN: [id number]  Admission Date: 2023  Primary Care Provider: Inna, Primary Doctor    Subjective:     Principal Problem: premature rupture of membranes    History of Present Illness:  Patient is a 37 yo  at unknown gestational age presenting this evening with concern for rupture of membranes. She reports that she felt a gush of clear fluid around 8 pm. She has had no prenatal care during this pregnancy. She recently had a baby in 2023 and her period had not returned. She is a very poor historian but reports that she has had 9 prior pregnancies with two  deliveries and one prior elective .     BSUS 23  EXAMINATION:  US OB LIMITED 1 OR MORE GESTATIONS     CLINICAL HISTORY:  unknown gestation;     TECHNIQUE:  Transabdominal 2nd or 3rd trimester obstetrical ultrasound was performed.     COMPARISON:  None.     FINDINGS:  There is a single, cephalic, intrauterine gestation with a combined biometric derived estimated gestational age of 28 weeks and 6 days and an SOO of 2024.  Estimated fetal weight is 2 pounds and 14 ounces with a Hadlock percentile of 37%.  The individual biometric parameters are as follows:     BPD: 7.01 cm     Head circumference: 26.97 cm     Abdominal circumference: 25.55 cm     Femur length: 5.17 cm     Amniotic fluid volume is within normal limits with maximum vertical pocket measuring 4.7 cm.  Fetal cardiac activity is seen with a rate of 135 beats per minute on M-mode imaging.     The cervix appears closed and there is no evidence placenta previa.  The placenta is posterior.     Impression:     Single, live, intrauterine gestation with a combine biometric derived estimated gestational age of 28 weeks and 6 days and an SOO of 2024.     Amniotic fluid volume is within normal limits and there is no evidence of placenta previa.    Obstetric HPI:  Patient reports  None contractions, active fetal movement, Yes vaginal bleeding , Yes loss of fluid     This pregnancy has been complicated by no prenatal care, prior  delivery x 2, methamphetamine use.    OB History    Para Term  AB Living   10 8 4 2 1 8   SAB IAB Ectopic Multiple Live Births   0 0 0 0 8      # Outcome Date GA Lbr Harshal/2nd Weight Sex Delivery Anes PTL Lv   10 Current            9 Term 23    M Vag-Spont   ALLEN   8 Term 21 38w2d / 00:08 2.69 kg (5 lb 14.9 oz) M Vag-Spont None N ALLEN      Complications: Late prenatal care      Name: NORTH NELSON      Apgar1: 9  Apgar5: 9   7 Para 20   3.204 kg (7 lb 1 oz) F Vag-Spont None N ALLEN      Birth Comments: moderate meconium stained skin       Name: DAVID NELSON      Apgar1: 8  Apgar5: 9   6 Para 17   2.336 kg (5 lb 2.4 oz) M Vag-Spont None  ALLEN      Complications: Precipitate labor, Precipitous delivery      Name: NORTH NELSON      Apgar1: 7  Apgar5: 8   5  /   2.676 kg (5 lb 14.4 oz) F Vag-Spont None  ALLEN      Complications: Precipitous delivery, Precipitate labor      Name: DAVID NELSON      Apgar1: 7  Apgar5: 7   4  /15    M    ALLEN   3 Term 07    M Vag-Spont   ALLEN   2 Term 05    M Vag-Spont   ALLEN   1 AB              Past Medical History:   Diagnosis Date    Anxiety     Depression     Drug abuse      History reviewed. No pertinent surgical history.    PTA Medications   Medication Sig    prenatal vit 87-iron-folic-dha (PRENATE MINI, FERR ASP GLYCIN,) 18-1-350 mg Cap Take 1 capsule by mouth once daily.    ibuprofen (ADVIL,MOTRIN) 800 MG tablet Take 1 tablet (800 mg total) by mouth every 8 (eight) hours.       Review of patient's allergies indicates:  No Known Allergies     Family History       Problem Relation (Age of Onset)    Hypertension Father    Ovarian cancer Maternal Grandmother    Stroke Father          Tobacco Use    Smoking status: Every Day     Types: Cigarettes     Smokeless tobacco: Never   Substance and Sexual Activity    Alcohol use: No    Drug use: Not Currently     Types: Methamphetamines    Sexual activity: Yes     Partners: Male     Birth control/protection: None     Comment: single     Review of Systems   Constitutional:  Negative for appetite change, chills and fever.   Eyes:  Negative for visual disturbance.   Respiratory:  Negative for shortness of breath.    Cardiovascular:  Negative for chest pain.   Gastrointestinal:  Negative for abdominal pain, constipation, diarrhea, nausea and vomiting.   Genitourinary:  Positive for vaginal discharge. Negative for vaginal bleeding.   Musculoskeletal:  Negative for back pain.   Neurological:  Negative for syncope and headaches.   Psychiatric/Behavioral:  Negative for depression. The patient is not nervous/anxious.       Objective:     Vital Signs (Most Recent):  Temp: 98.2 °F (36.8 °C) (11/27/23 2032)  Pulse: 77 (11/27/23 2223)  Resp: 18 (11/27/23 2032)  BP: 131/67 (11/27/23 2223)  SpO2: 99 % (11/27/23 2032) Vital Signs (24h Range):  Temp:  [98.2 °F (36.8 °C)] 98.2 °F (36.8 °C)  Pulse:  [] 77  Resp:  [18] 18  SpO2:  [99 %] 99 %  BP: (131-140)/(67-87) 131/67        There is no height or weight on file to calculate BMI.    FHT: 150s ; reassuring for gestational age  TOCO:  none     Physical Exam:   Constitutional: She is oriented to person, place, and time. She appears well-developed and well-nourished. No distress.    HENT:   Head: Normocephalic and atraumatic.      Cardiovascular:  Normal rate.             Pulmonary/Chest: Effort normal. No respiratory distress.        Abdominal: Soft. There is no abdominal tenderness.                 Neurological: She is alert and oriented to person, place, and time.    Skin: Skin is warm and dry. No erythema.    Psychiatric: She has a normal mood and affect. Her behavior is normal. Judgment and thought content normal.        Cervix:  Dilation:  1  Effacement:   50%  Station: -3  Presentation: Vertex     Significant Labs:  Lab Results   Component Value Date    GROUPTRH A NEG 08/19/2021    HEPBSAG Non-reactive 08/19/2021       I have personallly reviewed all pertinent lab results from the last 24 hours.  Recent Lab Results         11/27/23 2204 11/27/23 2040        Benzodiazepines   Negative       Methadone metabolites   Negative       Phencyclidine   Negative       Rupture of Membrane   Positive  Comment: The test may report positive results in patients with  intact membranes and therefore decisions to induce   labor should not be based solely on the ROM plus test.  The negative result should also be used in conjunction   with information available from clinical evaluation and   other diagnostic procedures.          Amphetamine Screen, Ur   Presumptive Positive       Appearance, UA   Clear       Bacteria, UA   Rare       Barbiturate Screen, Ur   Negative       Baso # 0.11         Basophil % 0.7         Bilirubin (UA)   Negative       Cocaine (Metab.)   Negative       Color, UA   Soha       Creatinine, Urine   27.6       Differential Method Automated         Eos # 0.4         Eosinophil % 2.3         Glucose, UA   Negative       Gran # (ANC) 12.4         Gran % 76.2         Hematocrit 29.4         Hemoglobin 9.0         Hyaline Casts, UA   0       Immature Grans (Abs) 0.35  Comment: Mild elevation in immature granulocytes is non specific and   can be seen in a variety of conditions including stress response,   acute inflammation, trauma and pregnancy. Correlation with other   laboratory and clinical findings is essential.           Immature Granulocytes 2.2         Ketones, UA   Negative       Leukocytes, UA   Negative       Lymph # 1.9         Lymph % 11.9         MCH 26.1         MCHC 30.6         MCV 85         Microscopic Comment   SEE COMMENT  Comment: Other formed elements not mentioned in the report are not   present in the microscopic examination.          Mono  # 1.1         Mono % 6.7         MPV 9.9         NITRITE UA   Negative       nRBC 0         Occult Blood UA   3+       Opiate Scrn, Ur   Negative       pH, UA   7.0       Platelet Count 410         Protein, UA   1+  Comment: Recommend a 24 hour urine protein or a urine   protein/creatinine ratio if globulin induced proteinuria is  clinically suspected.         RBC 3.45         RBC, UA   50       RDW 15.6         Specific [place], UA   1.015       Specimen UA   Urine, Clean Catch       Squam Epithel, UA   1       Marijuana (THC) Metabolite   Negative       Toxicology Information   SEE COMMENT  Comment: This screen includes the following classes of drugs at the listed   cut-off:    Benzodiazepines 200 ng/ml  Methadone 300 ng/ml  Cocaine metabolite 300 ng/ml  Opiates 300 ng/ml  Barbiturates 200 ng/ml  Amphetamines 1000 ng/ml  Marijuana metabs (THC) 50 ng/ml  Phencyclidine (PCP) 25 ng/ml    This is a screening test. If results do not correlate with clinical   presentation, then a confirmatory send out test is advised.     This report is intended for use in clinical monitoring and management   of   patients. It is not intended for use in employment related drug   testing.         UROBILINOGEN UA   Negative       WBC, UA   1       WBC 16.19               Assessment/Plan:     38 y.o. female  at Unknown for:    *  premature rupture of membranes  - ROM+ positive  - collect prenatal labs, UDS, GBS, urine culture  - will administer  betamethasone 12mg x 2 doses q24 hours  - start magnesium 6g bolus/2g per hour  - administer azithromycin 1g orally once and ampillicin 2g/hour q6 hours     History of methamphetamine use  - positive UDS on admission         Gale Ochoa MD  Obstetrics  [facility] - Labor & Delivery

## 2023-11-28 NOTE — SUBJECTIVE & OBJECTIVE
Obstetric HPI:  Patient reports None contractions, active fetal movement, Yes vaginal bleeding , Yes loss of fluid     This pregnancy has been complicated by no prenatal care, prior  delivery x 2, methamphetamine use.    OB History    Para Term  AB Living   10 8 4 2 1 8   SAB IAB Ectopic Multiple Live Births   0 0 0 0 8      # Outcome Date GA Lbr Harshal/2nd Weight Sex Delivery Anes PTL Lv   10 Current            9 Term 23    M Vag-Spont   ALLEN   8 Term 21 38w2d / 00:08 2.69 kg (5 lb 14.9 oz) M Vag-Spont None N ALLEN      Complications: Late prenatal care      Name: NORTH NELSON      Apgar1: 9  Apgar5: 9   7 Para 20   3.204 kg (7 lb 1 oz) F Vag-Spont None N ALLEN      Birth Comments: moderate meconium stained skin       Name: DAVID NELSON      Apgar1: 8  Apgar5: 9   6 Para 17   2.336 kg (5 lb 2.4 oz) M Vag-Spont None  ALLEN      Complications: Precipitate labor, Precipitous delivery      Name: NORTH NELSON      Apgar1: 7  Apgar5: 8   5  16   2.676 kg (5 lb 14.4 oz) F Vag-Spont None  ALLEN      Complications: Precipitous delivery, Precipitate labor      Name: DAVID NELSON      Apgar1: 7  Apgar5: 7   4  /15    M    ALLEN   3 Term 07    M Vag-Spont   ALLEN   2 Term 05    M Vag-Spont   ALLEN   1 AB              Past Medical History:   Diagnosis Date    Anxiety     Depression     Drug abuse      History reviewed. No pertinent surgical history.    PTA Medications   Medication Sig    prenatal vit 87-iron-folic-dha (PRENATE MINI, FERR ASP GLYCIN,) 18-1-350 mg Cap Take 1 capsule by mouth once daily.    ibuprofen (ADVIL,MOTRIN) 800 MG tablet Take 1 tablet (800 mg total) by mouth every 8 (eight) hours.       Review of patient's allergies indicates:  No Known Allergies     Family History       Problem Relation (Age of Onset)    Hypertension Father    Ovarian cancer Maternal Grandmother    Stroke Father          Tobacco Use    Smoking status:  Every Day     Types: Cigarettes    Smokeless tobacco: Never   Substance and Sexual Activity    Alcohol use: No    Drug use: Not Currently     Types: Methamphetamines    Sexual activity: Yes     Partners: Male     Birth control/protection: None     Comment: single     Review of Systems   Constitutional:  Negative for appetite change, chills and fever.   Eyes:  Negative for visual disturbance.   Respiratory:  Negative for shortness of breath.    Cardiovascular:  Negative for chest pain.   Gastrointestinal:  Negative for abdominal pain, constipation, diarrhea, nausea and vomiting.   Genitourinary:  Positive for vaginal discharge. Negative for vaginal bleeding.   Musculoskeletal:  Negative for back pain.   Neurological:  Negative for syncope and headaches.   Psychiatric/Behavioral:  Negative for depression. The patient is not nervous/anxious.       Objective:     Vital Signs (Most Recent):  Temp: 98.2 °F (36.8 °C) (11/27/23 2032)  Pulse: 77 (11/27/23 2223)  Resp: 18 (11/27/23 2032)  BP: 131/67 (11/27/23 2223)  SpO2: 99 % (11/27/23 2032) Vital Signs (24h Range):  Temp:  [98.2 °F (36.8 °C)] 98.2 °F (36.8 °C)  Pulse:  [] 77  Resp:  [18] 18  SpO2:  [99 %] 99 %  BP: (131-140)/(67-87) 131/67        There is no height or weight on file to calculate BMI.    FHT: 150s ; reassuring for gestational age  TOCO:  none     Physical Exam:   Constitutional: She is oriented to person, place, and time. She appears well-developed and well-nourished. No distress.    HENT:   Head: Normocephalic and atraumatic.      Cardiovascular:  Normal rate.             Pulmonary/Chest: Effort normal. No respiratory distress.        Abdominal: Soft. There is no abdominal tenderness.                 Neurological: She is alert and oriented to person, place, and time.    Skin: Skin is warm and dry. No erythema.    Psychiatric: She has a normal mood and affect. Her behavior is normal. Judgment and thought content normal.        Cervix:  Dilation:   1  Effacement:  50%  Station: -3  Presentation: Vertex     Significant Labs:  Lab Results   Component Value Date    GROUPTRH A NEG 08/19/2021    HEPBSAG Non-reactive 08/19/2021       I have personallly reviewed all pertinent lab results from the last 24 hours.  Recent Lab Results         11/27/23 2204 11/27/23 2040        Benzodiazepines   Negative       Methadone metabolites   Negative       Phencyclidine   Negative       Rupture of Membrane   Positive  Comment: The test may report positive results in patients with  intact membranes and therefore decisions to induce   labor should not be based solely on the ROM plus test.  The negative result should also be used in conjunction   with information available from clinical evaluation and   other diagnostic procedures.          Amphetamine Screen, Ur   Presumptive Positive       Appearance, UA   Clear       Bacteria, UA   Rare       Barbiturate Screen, Ur   Negative       Baso # 0.11         Basophil % 0.7         Bilirubin (UA)   Negative       Cocaine (Metab.)   Negative       Color, UA   Soha       Creatinine, Urine   27.6       Differential Method Automated         Eos # 0.4         Eosinophil % 2.3         Glucose, UA   Negative       Gran # (ANC) 12.4         Gran % 76.2         Hematocrit 29.4         Hemoglobin 9.0         Hyaline Casts, UA   0       Immature Grans (Abs) 0.35  Comment: Mild elevation in immature granulocytes is non specific and   can be seen in a variety of conditions including stress response,   acute inflammation, trauma and pregnancy. Correlation with other   laboratory and clinical findings is essential.           Immature Granulocytes 2.2         Ketones, UA   Negative       Leukocytes, UA   Negative       Lymph # 1.9         Lymph % 11.9         MCH 26.1         MCHC 30.6         MCV 85         Microscopic Comment   SEE COMMENT  Comment: Other formed elements not mentioned in the report are not   present in the microscopic  examination.          Mono # 1.1         Mono % 6.7         MPV 9.9         NITRITE UA   Negative       nRBC 0         Occult Blood UA   3+       Opiate Scrn, Ur   Negative       pH, UA   7.0       Platelet Count 410         Protein, UA   1+  Comment: Recommend a 24 hour urine protein or a urine   protein/creatinine ratio if globulin induced proteinuria is  clinically suspected.         RBC 3.45         RBC, UA   50       RDW 15.6         Specific [place], UA   1.015       Specimen UA   Urine, Clean Catch       Squam Epithel, UA   1       Marijuana (THC) Metabolite   Negative       Toxicology Information   SEE COMMENT  Comment: This screen includes the following classes of drugs at the listed   cut-off:    Benzodiazepines 200 ng/ml  Methadone 300 ng/ml  Cocaine metabolite 300 ng/ml  Opiates 300 ng/ml  Barbiturates 200 ng/ml  Amphetamines 1000 ng/ml  Marijuana metabs (THC) 50 ng/ml  Phencyclidine (PCP) 25 ng/ml    This is a screening test. If results do not correlate with clinical   presentation, then a confirmatory send out test is advised.     This report is intended for use in clinical monitoring and management   of   patients. It is not intended for use in employment related drug   testing.         UROBILINOGEN UA   Negative       WBC, UA   1       WBC 16.19

## 2023-11-28 NOTE — ASSESSMENT & PLAN NOTE
- Vital signs stable, afebrile  - Labs notable for leukocytosis: WBC 16  - Patient reports SROM with blood tinged fluid 2000 11/27/23  - PPROM Abx initiated at [facility] (11/27/23)  - MgSO4 initiated at [facility] for fetal neuroprotection  - BMZx1 @ 2300 (11/27), DMX x2 to be administered 2300 11/28  - GCCT collected and pending  - MVP 2.3 cm (previously 4.7 cm at [facility])  Plan:   Azithro 1g Day 1 and 5   IV Amp 2g q6h for 28 h   Amoxicillin 500 TID for 10 days following Amp  - Admit to L&D for observation  - [place]: Continuous  - Diet: NPO  - Cont. MgSO4 and Ampicillin

## 2023-11-28 NOTE — NURSING
Md called.  Notified of US results and SVE results.  MD states will call transfer line and call unit back. New orders given.

## 2023-11-28 NOTE — ASSESSMENT & PLAN NOTE
- Patient currently experiencing homelessness and staying with a friend  - Patient currently does not have custody of her 7 other children  - Reports she wishes to parent this child  - Social work consult to be placed

## 2023-11-28 NOTE — H&P
[facility] - Labor & Delivery  Obstetrics  Antepartum Progress Note    Patient Name: Nery Brown  MRN: [id number]  Admission Date: 2023  Hospital Length of Stay: 0 days  Attending Physician: Yeny Goldberg MD  Primary Care Provider: Inna, Primary Doctor    Subjective:     Principal Problem: premature rupture of membranes (PPROM) with unknown onset of labor    HPI:  Nery Brown is a 38 y.o. [id number] at 28 weeks 6 days dated by 28 week ultrasound presents as transfer from [place] in the setting of PPROM. Patient presented to ED reporting gush of fluid when sitting on the toilet at  with associated bleeding. At OSH, patient found to be grossly ruptured with blood tinged fluid, ROM+ collected and positive, cervical exam at that time /-3. BSUS growth performed and noted to be approximately 28w6d gestation and vertex. Patient was startedon PPROM antibiotics (Azithro x1 and Ampicillin x1) and MgSO4 for fetal neuroprotection and transferred to [facility] for higher level of care.     In [facility], patient resting comfortably in bed. Reports continued leaking of blood tinged fluid. She denies contractions at this time. Patient had  in 2023 and never had return of menses. Of note, patient is poor historian and realized she was pregnant when she felt fetal movements however cannot remember when that was. This pregnancy is complicated by history of substance abuse (Utox positive for methamphetamines, last used two days ago, denies other drug or alcohol use), poor social situation (patient currently experiencing homelessness, living with a friend and does not have custody of her other children), history of PTD x2, no prenatal care, and Rh negative status. Of note, patient's blood pressure noted to be elevated in [place]. Patient denies symptoms of PreE. Reports possible history of elevated blood pressure in previous pregnancy and was started on a blood pressure medication which she self  discontinued.         Hospital Course:  2023 Presented to [facility] in the setting of concern for rupture of membranes. Noted to be grossly ruptured with blood tinged fluid, SVE 1/50/-3. BSUS approximately 28 weeks gestation. Started on MgSO4 and started on PPROM antibiotics.   2023 Arrival to [facility]. Severe range x1 on initial presentation followed by mild range BP. Patient denies symptoms of PreE. Patient with continued LOF but denies contractions. Exam with blood tinged fluid in vault. Visually 1 cm. BSUS vertex with MVP 2.68. Admit to L&D for PPROM abx and continue MgSO4 for fetal neuroprotection.     Obstetric HPI:  Patient reports None contractions, active fetal movement, Yes vaginal bleeding , Yes loss of fluid         OB History    Para Term  AB Living   10 8 4 2 1 8   SAB IAB Ectopic Multiple Live Births   0 0 0 0 8      # Outcome Date GA Lbr Harshal/2nd Weight Sex Delivery Anes PTL Lv   10 Current            9 Term 23    M Vag-Spont   ALLEN   8 Term 21 38w2d / 00:08 2.69 kg (5 lb 14.9 oz) M Vag-Spont None N ALLEN      Complications: Late prenatal care      Name: NORTH NELSON      Apgar1: 9  Apgar5: 9   7 Para 20   3.204 kg (7 lb 1 oz) F Vag-Spont None N ALLEN      Birth Comments: moderate meconium stained skin       Name: DAVID NELSON      Apgar1: 8  Apgar5: 9   6 Para 17   2.336 kg (5 lb 2.4 oz) M Vag-Spont None  ALLEN      Complications: Precipitate labor, Precipitous delivery      Name: NORTH NELSON      Apgar1: 7  Apgar5: 8   5  16   2.676 kg (5 lb 14.4 oz) F Vag-Spont None  ALLEN      Complications: Precipitous delivery, Precipitate labor      Name: DAVID NELSON      Apgar1: 7  Apgar5: 7   4  /15    M    ALLEN   3 Term 07    M Vag-Spont   ALLEN   2 Term 05    M Vag-Spont   ALLEN   1 AB              Past Medical History:   Diagnosis Date    Anxiety     Depression     Drug abuse      No past surgical  history on file.    (Not in a hospital admission)      Review of patient's allergies indicates:  No Known Allergies     Family History       Problem Relation (Age of Onset)    Hypertension Father    Ovarian cancer Maternal Grandmother    Stroke Father          Tobacco Use    Smoking status: Every Day     Types: Cigarettes    Smokeless tobacco: Never   Substance and Sexual Activity    Alcohol use: No    Drug use: Not Currently     Types: Methamphetamines    Sexual activity: Yes     Partners: Male     Birth control/protection: None     Comment: single     Review of Systems   Constitutional:  Negative for fever.   Respiratory:  Negative for shortness of breath.    Cardiovascular:  Negative for chest pain.   Gastrointestinal:  Negative for abdominal pain, nausea and vomiting.   Endocrine: Negative for hot flashes.   Genitourinary:  Negative for vaginal bleeding and vaginal discharge.   Integumentary:  Negative for breast mass, nipple discharge and breast skin changes.   Neurological:  Negative for headaches.   Hematological:  Does not bruise/bleed easily.   Psychiatric/Behavioral:  Negative for depression.    Breast: Negative for mass, mastodynia, nipple discharge and skin changes     Objective:     Vital Signs (Most Recent):  Pulse: 81 (11/28/23 0135)  BP: (!) 150/85 (11/28/23 0135)  SpO2: 99 % (11/28/23 0135) Vital Signs (24h Range):  Temp:  [98.2 °F (36.8 °C)] 98.2 °F (36.8 °C)  Pulse:  [] 81  Resp:  [18] 18  SpO2:  [99 %-100 %] 99 %  BP: (125-167)/(66-94) 150/85        There is no height or weight on file to calculate BMI.    FHT: 140 Cat 1 (reassuring)  TOCO:  quiet     Physical Exam:   Constitutional: She is oriented to person, place, and time. She appears well-developed and well-nourished.       Cardiovascular:  Normal rate.             Pulmonary/Chest: Effort normal.        Abdominal: Soft. She exhibits no distension.                 Neurological: She is alert and oriented to person, place, and time.     Skin: Skin is warm and dry.    Psychiatric: She has a normal mood and affect. Her behavior is normal. Thought content normal.        Cervix:  Dilation:  1  Effacement:  50%  Station: -3  Presentation: Vertex   SVE per outside hospital, visually 1 cm    Significant Labs:  Lab Results   Component Value Date    GROUPTRH A NEG 11/27/2023    HEPBSAG Non-reactive 08/19/2021       Recent Lab Results         11/27/23 2204 11/27/23 2040        Benzodiazepines   Negative       Methadone metabolites   Negative       Phencyclidine   Negative       Rupture of Membrane   Positive  Comment: The test may report positive results in patients with  intact membranes and therefore decisions to induce   labor should not be based solely on the ROM plus test.  The negative result should also be used in conjunction   with information available from clinical evaluation and   other diagnostic procedures.          Amphetamine Screen, Ur   Presumptive Positive       Appearance, UA   Clear       Bacteria, UA   Rare       Barbiturate Screen, Ur   Negative       Baso # 0.11         Basophil % 0.7         Bilirubin (UA)   Negative       Cocaine (Metab.)   Negative       Color, UA   Soha       Creatinine, Urine   27.6       Differential Method Automated         Eos # 0.4         Eosinophil % 2.3         Glucose, UA   Negative       Gran # (ANC) 12.4         Gran % 76.2         Group & Rh A NEG         Hematocrit 29.4         Hemoglobin 9.0         HIV Rapid Testing Non-Reactive  Comment: Interpretation: Negative for HIV-1 and HIV-2 antibodies.         Hyaline Casts, UA   0       Immature Grans (Abs) 0.35  Comment: Mild elevation in immature granulocytes is non specific and   can be seen in a variety of conditions including stress response,   acute inflammation, trauma and pregnancy. Correlation with other   laboratory and clinical findings is essential.           Immature Granulocytes 2.2         INDIRECT JAYY NEG         Ketones, UA    Negative       Leukocytes, UA   Negative       Lymph # 1.9         Lymph % 11.9         MCH 26.1         MCHC 30.6         MCV 85         Microscopic Comment   SEE COMMENT  Comment: Other formed elements not mentioned in the report are not   present in the microscopic examination.          Mono # 1.1         Mono % 6.7         MPV 9.9         NITRITE UA   Negative       nRBC 0         Occult Blood UA   3+       Opiate Scrn, Ur   Negative       pH, UA   7.0       Platelet Count 410         Protein, UA   1+  Comment: Recommend a 24 hour urine protein or a urine   protein/creatinine ratio if globulin induced proteinuria is  clinically suspected.         RBC 3.45         RBC, UA   50       RDW 15.6         Specific Talent, UA   1.015       Specimen Outdate 2023 23:59         Specimen UA   Urine, Clean Catch       Squam Epithel, UA   1       Marijuana (THC) Metabolite   Negative       Toxicology Information   SEE COMMENT  Comment: This screen includes the following classes of drugs at the listed   cut-off:    Benzodiazepines 200 ng/ml  Methadone 300 ng/ml  Cocaine metabolite 300 ng/ml  Opiates 300 ng/ml  Barbiturates 200 ng/ml  Amphetamines 1000 ng/ml  Marijuana metabs (THC) 50 ng/ml  Phencyclidine (PCP) 25 ng/ml    This is a screening test. If results do not correlate with clinical   presentation, then a confirmatory send out test is advised.     This report is intended for use in clinical monitoring and management   of   patients. It is not intended for use in employment related drug   testing.         UROBILINOGEN UA   Negative       WBC, UA   1       WBC 16.19               Assessment/Plan:     38 y.o. female  at Unknown for:    *  premature rupture of membranes (PPROM) with unknown onset of labor  - Vital signs stable, afebrile  - Labs notable for leukocytosis: WBC 16  - Patient reports SROM with blood tinged fluid 23  - PPROM Abx initiated at [facility] (23)  - MgSO4  initiated at [facility] for fetal neuroprotection  - BMZx1 @ 2300 (11/27), DMX x2 to be administered 2300 11/28  - GCCT collected and pending  - MVP 2.3 cm (previously 4.7 cm at [facility])  Plan:   Azithro 1g Day 1 and 5   IV Amp 2g q6h for 28 h   Amoxicillin 500 TID for 10 days following Amp  - Admit to L&D for observation  - [place]: Continuous  - Diet: NPO  - Cont. MgSO4 and Ampicillin    Poor social situation  - Patient currently experiencing homelessness and staying with a friend  - Patient currently does not have custody of her 7 other children  - Reports she wishes to parent this child  - Social work consult to be placed    Unwanted fertility  - Patient desires post partum tubal ligation  - Medicaid consents signed  - [facility] consents to be signed    Rh negative state in antepartum period  - Patient has not received rho quan this pregnancy  - Rho quan to be administered, Rho quan work up pp    Anemia affecting 10th pregnancy  - H/H 9/29.4  - Iron/colace  - 2u held      Gestational hypertension  - Elevated BP >4 hours apart since arrival to [facility] per chart review  - One unsustained severe range BP on initially arrival to [facility]  - Asymptomatic  - Reports history of elevated blood pressure in prior pregnancy and discharged home on antihypertensive, patient reports self discontinuation. Denies elevated blood pressure outside of pregnancy  - Plts 410, CMP pending, P:Cr N/A d/t ROM        29 weeks gestation of pregnancy  - Primary OB: None  - Growth: 1304g, EFW 37% 28w6d (11/27)  - Tdap: needs  - Glucola: needs  - Rhogam: needs  - GBS: unk, collected and pending  - BSUS vertex  - Prenatal labs collected and pending  - Delivery and blood consents signed. Patient desires tubal ligation. Medicaid consents signed.     History of drug abuse  - Utox at OSH +Methamphetamines  - Patient last used two days ago, denies symptoms of withdrawal   - Denies ever experiencing  withdrawal  - Social work consult           Katherine C Boecking, MD  Obstetrics  [facility] - Labor & Delivery

## 2023-11-28 NOTE — NURSING
OB TRIAGE ASSESSMENT    RE: Nery Brown  MRN:  [id number]  :  1985  AGE:  38 y.o.    Date:  2023    PHYSICIAN: Gale Ochoa MD    Allergies: Patient has no known allergies.    Nery is a 38 y.o.  at Unknown gestational age presents with complaint(s) of rupture of membranes.      Triage Course:    Patient was evaluated in triage on L&D.  NST was NST reactive. Patient was monitored for 20 minutes.  Cervix was 1/50/-3.  She received the following medications: lactated ringers, betamethasone, magnesium sulfate, ampicillin, azythromycin, zofran, calcium carbonate. See MAR.   The following labs were performed: ROM Plus, Urinalysis, CBC, and Prenatal lab panel  Plan of care was discussed with MD on call. MD came to unit.  Initiated transfer.  Transfer accepted to [facility].         NST:    145 BPM baseline  Variability:  Good {> 6 bpm)  Accelerations:  Non-reactive but appropriate for gestational age  Decelerations:  none    [place]: contractions Q 0 minutes          Dee Lomax   2023; 10:34 PM

## 2023-11-28 NOTE — NURSING
MD called. Notified of patients arrival.  Notified MD that patient has no prenatal care and patient does not know how far along she is.  Pt believes she is ruptured.  RN went to perform SVE and noticed blood clot in perineum and blood on perineum.  MD called immediately. MD also notified of FHTs. Md ordered for STAT ultrasound. ROM + and is positive will perform SVE.

## 2023-11-28 NOTE — ANESTHESIA PREPROCEDURE EVALUATION
[facility]  Anesthesia Pre-Operative Evaluation         Patient Name: Nery Brown  YOB: 1985  MRN: [id number]    2023      Nery Brown is a 38 y.o. female  with IUP @ 28 weeks 6 days who presents as a transfer from OSH in setting of PPROM. IUP has been complicated by gestational HTN, unwanted fertility, no prenatal care, h/o PTD x2, anemia, substance use disorder (utox positive for methamphetamines), and Rh negative status.     She otherwise has no noted bleeding/clotting disorder, significant cardiopulmonary disease or spinal pathology.        OB History    Para Term  AB Living   10 8 4 2 1 8   SAB IAB Ectopic Multiple Live Births         0 8      # Outcome Date GA Lbr Harshal/2nd Weight Sex Delivery Anes PTL Lv   10 Current            9 Term 23    M Vag-Spont   ALLEN   8 Term 21 38w2d / 00:08 2.69 kg (5 lb 14.9 oz) M Vag-Spont None N ALLEN      Complications: Late prenatal care   7 Para 20   3.204 kg (7 lb 1 oz) F Vag-Spont None N ALLEN      Birth Comments: moderate meconium stained skin    6 Para 17   2.336 kg (5 lb 2.4 oz) M Vag-Spont None  ALLEN      Complications: Precipitate labor, Precipitous delivery   5  16   2.676 kg (5 lb 14.4 oz) F Vag-Spont None  ALLEN      Complications: Precipitous delivery, Precipitate labor   4  07/18/15    M    ALLEN   3 Term 07    M Vag-Spont   ALLEN   2 Term 05    M Vag-Spont   ALLEN   1 AB                Review of patient's allergies indicates:  No Known Allergies    Wt Readings from Last 1 Encounters:   23 0205 67.6 kg (149 lb 0.5 oz)       BP Readings from Last 3 Encounters:   23 (!) 147/73   23 125/66   23 123/79       Patient Active Problem List   Diagnosis     delivered after precipitous labor    No prenatal care in current pregnancy    Labor, precipitous, delivered    No prenatal care in current pregnancy in third trimester    History of drug  "abuse    History of  delivery, currently pregnant in third trimester    Cellulitis of face    Threatened labor at term    Lower abdominal pain     premature rupture of membranes    History of methamphetamine use     premature rupture of membranes (PPROM) with unknown onset of labor    29 weeks gestation of pregnancy    Gestational hypertension    Anemia affecting 10th pregnancy    Rh negative state in antepartum period    Unwanted fertility    Poor social situation       No past surgical history on file.    Social History     Socioeconomic History    Marital status: Significant Other   Tobacco Use    Smoking status: Every Day     Types: Cigarettes    Smokeless tobacco: Never   Substance and Sexual Activity    Alcohol use: No    Drug use: Not Currently     Types: Methamphetamines    Sexual activity: Yes     Partners: Male     Birth control/protection: None     Comment: single         Chemistry        Component Value Date/Time     (L) 2023    K 4.0 2023     2023 021    CO2 19 (L) 2023    BUN 8 2023    CREATININE 0.6 2023     2023        Component Value Date/Time    CALCIUM 8.4 (L) 2023    ALKPHOS 106 2023 0212    AST 24 2023 0212    ALT 16 2023 0212    BILITOT 0.1 2023    ESTGFRAFRICA >60 07/10/2018 1030    EGFRNONAA >60 07/10/2018 1030            Lab Results   Component Value Date    WBC 16.19 (H) 2023    HGB 9.0 (L) 2023    HCT 29.4 (L) 2023    MCV 85 2023     2023       No results for input(s): "PT", "INR", "PROTIME", "APTT" in the last 72 hours.          Pre-op Assessment    I have reviewed the Patient Summary Reports.     I have reviewed the Nursing Notes. I have reviewed the NPO Status.   I have reviewed the Medications.     Review of Systems  Anesthesia Hx:  No problems with previous Anesthesia               Denies Personal " Hx of Anesthesia complications.                    Social:  Recreational Drugs     Illicit Drug Use: Types of drugs include Methamphetamines  Hematology/Oncology:    Oncology Normal    -- Anemia:                                  EENT/Dental:  EENT/Dental Normal           Cardiovascular:     Hypertension                                        Pulmonary:  Pulmonary Normal                       Renal/:  Renal/ Normal                 Hepatic/GI:  Hepatic/GI Normal                 Musculoskeletal:  Musculoskeletal Normal                Neurological:  Neurology Normal                                      Endocrine:  Endocrine Normal            Dermatological:  Skin Normal    Psych:  Psychiatric History                  Physical Exam  General: Well nourished, Cooperative and Alert    Airway:  Mallampati: II   Mouth Opening: Normal  TM Distance: Normal  Tongue: Normal  Neck ROM: Normal ROM    Dental:  Intact        Anesthesia Plan  Type of Anesthesia, risks & benefits discussed:    Anesthesia Type: Gen ETT, Spinal, CSE, Epidural  Intra-op Monitoring Plan: Standard ASA Monitors  Post Op Pain Control Plan: multimodal analgesia and IV/PO Opioids PRN  Induction:  IV  Airway Plan: Video, Post-Induction  Informed Consent: Informed consent signed with the Patient and all parties understand the risks and agree with anesthesia plan.  All questions answered. Patient consented to blood products? Yes  ASA Score: 3  Day of Surgery Review of History & Physical: H&P Update referred to the surgeon/provider.I have interviewed and examined the patient. I have reviewed the patient's H&P dated:     Ready For Surgery From Anesthesia Perspective.     .

## 2023-11-28 NOTE — ASSESSMENT & PLAN NOTE
- Utox at OSH +Methamphetamines  - Patient last used two days ago, denies symptoms of withdrawal   - Denies ever experiencing withdrawal  - Social work consult

## 2023-11-28 NOTE — ASSESSMENT & PLAN NOTE
- Primary OB: None  - Growth: 1304g, EFW 37% 28w6d (11/27)  - Tdap: needs  - Glucola: needs  - Rhogam: needs  - GBS: unk, collected and pending  - BSUS vertex  - Prenatal labs collected and pending  - Delivery and blood consents signed. Patient desires tubal ligation. Medicaid consents signed.

## 2023-11-28 NOTE — PROGRESS NOTES
"Maternal Fetal Medicine  Progress Note          Subjective:    Nery Brown is a 38 y.o.  at 29w0d admitted for PPROM. Please see H&P for details regarding presentation at admission. This pregnancy is complicated by history of substance abuse ([facility] positive for methamphetamines, last used two days ago, denies other drug or alcohol use), poor social situation (patient currently experiencing homelessness, living with a friend and does not have custody of her other children), history of PTD x2, no prenatal care, gHTN, anemia, and Rh negative status.    Interim HPI: Patient reports feeling well overnight. Denies contractions or vaginal bleeding. Reports continued leakage of small amounts of fluid. Reports normal fetal movement. Denies F/C.     Medical, Surgical, Social, Family, and Obstetric History: reviewed in chart  Current Medications:    [START ON 2023] amoxicillin  500 mg Oral Q8H    ampicillin IVPB  2 g Intravenous Q6H    [START ON 2023] azithromycin  1,000 mg Oral Daily    dexAMETHasone  6 mg Intramuscular Q12H    prenatal vitamin  1 tablet Oral Daily    0.9%  NaCl infusion (for blood administration), acetaminophen, calcium gluconate, diphenhydrAMINE, diphenhydrAMINE, ondansetron, prochlorperazine, senna-docusate 8.6-50 mg, simethicone, sodium chloride 0.9%   Objective:   BP (!) 147/78   Pulse 91   Temp 97.8 °F (36.6 °C) (Oral)   Resp 16   Ht 5' 6" (1.676 m)   Wt 67.6 kg (149 lb 0.5 oz)   LMP  (LMP Unknown)   SpO2 98%   Breastfeeding No   BMI 24.05 kg/m²     Focused Physical Examination   General: well developed, no acute distress  Pulmonary: respiratory effort normal with no retractions  Abdomen: gravid, no fundal tenderness  Cervix: deferred this AM    Fetal Monitoring  EFM: 120 baseline/ moderate variability/+ accels/- decels  Tocometer: quiet     Lab Results  CBC   Recent Labs   Lab 23  2204   WBC 16.19*   RBC 3.45*   HGB 9.0*   HCT 29.4*      MCV 85   MCH 26.1* "   MCHC 30.6*    CMP   Recent Labs   Lab 23  0212   CALCIUM 8.4*   ALBUMIN 2.3*   PROT 6.7   *   K 4.0   CO2 19*      BUN 8   CREATININE 0.6   ALKPHOS 106   ALT 16   AST 24   BILITOT 0.1      ROM +: positive  Utox: amphetamines    GC/CT, affirm pending      Assessment:   38 y.o.  at 29w0d admitted for PPROM   Plan:   PPROM  - Remains afebrile, VSS   - Continue PPROM abx, currently day 2   - Ampicillin 2g Q6H x48H followed by amoxicillin 500 TID for 10 total days of therapy   - Azithromycin 1g D1 and D5  - Continue steroids, 1st dose of BMZ @2300 , will give DMX x2 to complete course   - As monitoring has remained reassuring will transition to NST BID   - Regular diet   - Discontinue MgSO4. Will restart if delivery is imminent   - Continue to monitor closely for signs of PTL, IAI  - Goal delivery 34wga, sooner if indicated    Gestational Hypertension   - Vitals as above   - Labs on admit   - CBC:   - Cr 0.6, AST/ALT   - P:C not collected as patient is grossly ruptured   - Asymptomatic   - Monitor symptoms and labs closely     Anemia   - H/H on admit   - Asymptomatic   - Iron/colace   - 2u pRBC held due to high hemorrhage risk     Rh Negative   - Rhogam administered     History of  Delivery   -  delivery 2016 and 2017 at unknown [place], however, infants were 5# so presumed      History of Substance Abuse   - Utox at [place] positive for amphetamines   - Patient reports last using methamphetamines 2-3 days ago  - Currently denies withdrawal symptoms, denies ever experiencing withdrawal  - Social work consulted     Poor Social Situation  - Patient currently experiencing homelessness and staying with a friend   - She does not have custody of her other 7 children   - Patient wishes to parent this child   - Social work consulted     Unwanted Fertility  - Patient desires postpartum tubal ligation  - Medicaid and [facility] tubal consents signed 29  Weeks Gestation   - Primary OBGYN: none  - Growth US 11/27: 1304g, EGA 28w6d   - Formal growth and anatomy scan ordered  - Glucola: needs    - Will complete approximately 1 week after steroid course   - Needs Tdap, flu vaccines   - PNV  - Prenatal labs collected and pending       Sandra Langford MD  OBGYN, PGY-3  ______________________________________  Fellow Attestation    I have reviewed and concur with the resident's history, physical, assessment, and plan. I have personally interviewed and examined the patient at bedside.   See addendum bellow for my evaluation and additional findings:    Now 29w0d admitted for the following:  Hospital Day: 1    PPROM: discussed standard management with latency abx for 10d and expectant management. We discussed that reasons for delivery would include infection, abruption, labor, or fetal distress.  Recent methamphetamine use: last used 2d prior. Denies any other recent drug use. We discussed that SW will discuss any implications this may or may not have following delivery, but emphasized medical team is not involved in these details and treatment is unaffected by this.   Anemia: normocytic. chronic and has never been thoroughly evaluated in our system. Fe studies and electrophoresis ordered.   Remainder of plan as outlined above    NST: 125/mod/+acc/+dec    NEHA Medellin MD  Maternal Fetal Medicine Fellow   PGY-5

## 2023-11-28 NOTE — ED PROVIDER NOTES
Encounter Date: 2023       History     Chief Complaint   Patient presents with     Premature Rupture Of Membranes     HPI  Nery Brown is a 38 y.o. [id number] at approximately 28 weeks gestation as transfer from [facility] in the setting of PPROM.     This IUP is complicated by poor social situation, no prenatal care, substance abuse (last used meth two days ago), grand multip, unwanted fertility, elevated BP, history of precipitous  deliveries.  Patient denies contractions, reports vaginal bleeding, reports LOF.   Fetal Movement: normal.     Review of patient's allergies indicates:  No Known Allergies  Past Medical History:   Diagnosis Date    Anxiety     Depression     Drug abuse      No past surgical history on file.  Family History   Problem Relation Age of Onset    Ovarian cancer Maternal Grandmother     Hypertension Father     Stroke Father     Colon cancer Neg Hx     Breast cancer Neg Hx      Social History     Tobacco Use    Smoking status: Every Day     Types: Cigarettes    Smokeless tobacco: Never   Substance Use Topics    Alcohol use: No    Drug use: Not Currently     Types: Methamphetamines     Review of Systems   Constitutional:  Negative for chills, fatigue and fever.   Eyes:  Negative for visual disturbance.   Respiratory:  Negative for shortness of breath.    Cardiovascular:  Negative for chest pain and leg swelling.   Gastrointestinal:  Negative for nausea and vomiting.   Genitourinary:  Positive for vaginal bleeding and vaginal discharge. Negative for dysuria.   Neurological:  Negative for light-headedness and headaches.   Psychiatric/Behavioral:  Negative for confusion.        Physical Exam     Initial Vitals   BP Pulse Resp Temp SpO2   23 0104 23 0104 -- -- 23 010   (!) 167/77 76   100 %      MAP       --              Patient Vitals for the past 24 hrs:   BP Temp Temp src Pulse Resp SpO2 Height Weight   23 0150 (!) 144/91 -- -- 76 -- 99 % -- --    11/28/23 0135 (!) 150/85 -- -- 81 -- 99 % -- --   11/28/23 0125 (!) 158/94 -- -- 82 -- 99 % -- --   11/28/23 0105 -- -- -- 85 -- 100 % -- --   11/28/23 0104 (!) 167/77 -- -- 76 -- -- -- --       Physical Exam    Vitals reviewed.  Constitutional: She appears well-developed and well-nourished.   HENT:   Head: Normocephalic.   Nose: Nose normal.   Eyes: EOM are normal. Pupils are equal, round, and reactive to light.   Neck:   Normal range of motion.  Cardiovascular:  Normal rate, regular rhythm and intact distal pulses.           Pulmonary/Chest: Breath sounds normal. She exhibits no tenderness.   Abdominal: Abdomen is soft. She exhibits no distension. There is no abdominal tenderness. There is no rebound and no guarding.   Musculoskeletal:      Cervical back: Normal range of motion.     Neurological: She is alert and oriented to person, place, and time. She has normal strength.   Skin: Skin is warm and dry. No erythema.   Psychiatric: She has a normal mood and affect. Thought content normal.     OB LABOR EXAM:       Method: Sterile speculum exam per MD.                 Comments: Blood tinged scant fluid, nitrazine negative, visually 1 cm dilated       ED Course   Fetal non-stress test    Date/Time: 11/28/2023 2:15 AM    Performed by: Boecking, Katherine C, MD  Authorized by: Yeny Goldberg MD    Nonstress Test:     Variability:  6-25 BPM    Decelerations:  None    Accelerations:  15 bpm    Contractions:  Not present  Biophysical Profile:     Nonstress Test Interpretation: reactive      Overall Impression:  Reassuring  Post-procedure:     Patient tolerance:  Patient tolerated the procedure well with no immediate complications    Labs Reviewed   C. TRACHOMATIS/N. GONORRHOEAE BY AMP DNA   VAGINOSIS SCREEN BY DNA PROBE   HEPATITIS B SURFACE ANTIGEN          Imaging Results    None          Medications   sodium chloride 0.9% flush 10 mL (has no administration in time range)   acetaminophen tablet 650 mg (has no  administration in time range)   ondansetron disintegrating tablet 8 mg (has no administration in time range)   prochlorperazine injection Soln 5 mg (has no administration in time range)   senna-docusate 8.6-50 mg per tablet 1 tablet (has no administration in time range)   simethicone chewable tablet 80 mg (has no administration in time range)   diphenhydrAMINE capsule 25 mg (has no administration in time range)   diphenhydrAMINE injection 25 mg (has no administration in time range)   prenatal vitamin oral tablet (has no administration in time range)   dexAMETHasone injection 6 mg (has no administration in time range)   magnesium sulfate in water 40 gram/1,000 mL (4 %) infusion (2 g/hr Intravenous Restarted 11/28/23 0220)   ampicillin (OMNIPEN) 2 g in sodium chloride 0.9 % 100 mL IVPB (MB+) (has no administration in time range)   azithromycin tablet 1,000 mg (has no administration in time range)   rho(D) immune globulin injection 300 mcg (has no administration in time range)   0.9%  NaCl infusion (for blood administration) (has no administration in time range)     Medical Decision Making  Amount and/or Complexity of Data Reviewed  Labs: ordered.    Risk  Prescription drug management.              Attending Attestation:   Physician Attestation Statement for Resident:  As the supervising MD   Physician Attestation Statement: I have personally seen and examined this patient.   I agree with the above history.  -:   As the supervising MD I agree with the above PE.     As the supervising MD I agree with the above treatment, course, plan, and disposition.   I was personally present during the critical portions of the procedure(s) performed by the resident and was immediately available in the ED to provide services and assistance as needed during the entire procedure.  I have reviewed and agree with the residents interpretation of the following: lab data.  I have reviewed the following: old records at this facility.             Attending ED Notes:   I have personally seen and examined the patient. I agree with the resident's note . Questions welcomed and answered to patient satisfaction.      @ 28w6d transferred for PPROM  PPROM: per exam at OSH with +labs, admit for MFM service  GHTN vs cHTN: overall mild ranging, labs wnl  No PNC  Amphetamine use  +UDS at OSH, pt reports last use 2 days ago; likely no direct contribution to elevated BP.   Rh neg  Microcytic anemia: Hgb 9.0    NST: reassuring, continue to monitor on L&D        Meka Arevalo MD  2023 5:47 AM                   - One unsustained severe range BP, repeat mild range. Asymptomatic.  - Labs wnl  - NST RR, TOCO quiet  - Cervical exam consistent with outside hospital, no gross fluid however scant blood tinged fluid appreciated on speculum exam. ROM+ positive at OSH.  - BSUS vertex, MVP 2.3 cm  - Consents signed  - GCCT collected  - Discussed with MFM and admit to L&D in the setting of PPROM              Clinical Impression:  Final diagnoses:  [O42.919]  premature rupture of membranes (PPROM) with unknown onset of labor  [O26.899, Z67.91] Rh negative state in antepartum period (Primary)  [Z3A.28] 28 weeks gestation of pregnancy  [O09.33] No prenatal care in current pregnancy in third trimester  [F15.10] Amphetamine abuse  [Z65.9] Poor social situation  [O99.019, O09.40] Anemia affecting 10th pregnancy          ED Disposition Condition    Send to L&D                 Boecking, Katherine C, MD  Resident  [phone]       Meka Mauricio MD  [phone]

## 2023-11-28 NOTE — ASSESSMENT & PLAN NOTE
- Elevated BP >4 hours apart since arrival to [facility] per chart review  - One unsustained severe range BP on initially arrival to [facility]  - Asymptomatic  - Reports history of elevated blood pressure in prior pregnancy and discharged home on antihypertensive, patient reports self discontinuation. Denies elevated blood pressure outside of pregnancy  - Plts 410, CMP pending, P:Cr N/A d/t ROM

## 2023-11-28 NOTE — HOSPITAL COURSE
Patient presented with concern for rupture of membranes, which were confirmed. Ultrasound was performed and pregnancy is ~28 weeks. Transfer initiated.

## 2023-11-28 NOTE — HPI
Nery Brown is a 38 y.o. [id number] at 28 weeks 6 days dated by 28 week ultrasound presents as transfer from [place] in the setting of PPROM. Patient presented to ED reporting gush of fluid when sitting on the toilet at  with associated bleeding. At OSH, patient found to be grossly ruptured with blood tinged fluid, ROM+ collected and positive, cervical exam at that time /-3. BSUS growth performed and noted to be approximately 28w6d gestation and vertex. Patient was startedon PPROM antibiotics (Azithro x1 and Ampicillin x1) and MgSO4 for fetal neuroprotection and transferred to [facility] for higher level of care.     In [facility], patient resting comfortably in bed. Reports continued leaking of blood tinged fluid. She denies contractions at this time. Patient had  in 2023 and never had return of menses. Of note, patient is poor historian and realized she was pregnant when she felt fetal movements however cannot remember when that was. This pregnancy is complicated by history of substance abuse (Utox positive for methamphetamines, last used two days ago, denies other drug or alcohol use), poor social situation (patient currently experiencing homelessness, living with a friend and does not have custody of her other children), history of PTD x2, no prenatal care, and Rh negative status. Of note, patient's blood pressure noted to be elevated in [facility]. Patient denies symptoms of PreE. Reports possible history of elevated blood pressure in previous pregnancy and was started on a blood pressure medication which she self discontinued.

## 2023-11-28 NOTE — HOSPITAL COURSE
2023 Presented to [facility] in the setting of concern for rupture of membranes. Noted to be grossly ruptured with blood tinged fluid, SVE 1/50/-3. BSUS approximately 28 weeks gestation. Started on MgSO4 and started on PPROM antibiotics.   2023 Arrival to [facility]. Severe range x1 on initial presentation followed by mild range BP. Patient denies symptoms of PreE. Patient with continued LOF but denies contractions. Exam with blood tinged fluid in vault. Visually 1 cm. BSUS vertex with MVP 2.68. Admit to L&D for PPROM abx and continue MgSO4 for fetal neuroprotection. .  2023: De-escalated yesterday due to reassuring monitoring and exam. Anatomy/growth yesterday consistent with 28wga (now 28w1d today). Visualized anatomy wnl. VSS, asymptomatic. No s/s of IAI,  labor. Continue inpatient management.  2023: Afebrile, VSS. No signs of IAI, PTL. Continue PPROM abx D #3. Episode of leg pain yesterday, resolved. LE dopplers negative for DVT.   2023: Episode of vaginal bleeding overnight with recurrent decelerations, urgent  delivery performed. PPH with QBL 1025mL, otherwise uncomplicated.   2023-2023: POD #1-3. Meeting all postop milestones. H/H stable. Will arrange for BP check and postpartum care at [facility]. Stable for discharge.

## 2023-11-28 NOTE — ASSESSMENT & PLAN NOTE
- Patient has not received rho quan this pregnancy  - Rho quan to be administered, Rho quan work up pp

## 2023-11-28 NOTE — ASSESSMENT & PLAN NOTE
- Patient desires post partum tubal ligation  - Medicaid consents signed  - [facility] consents to be signed

## 2023-11-28 NOTE — SUBJECTIVE & OBJECTIVE
Obstetric HPI:  Patient reports None contractions, active fetal movement, Yes vaginal bleeding , Yes loss of fluid         OB History    Para Term  AB Living   10 8 4 2 1 8   SAB IAB Ectopic Multiple Live Births   0 0 0 0 8      # Outcome Date GA Lbr Harshal/2nd Weight Sex Delivery Anes PTL Lv   10 Current            9 Term 23    M Vag-Spont   ALLEN   8 Term 21 38w2d / 00:08 2.69 kg (5 lb 14.9 oz) M Vag-Spont None N ALLEN      Complications: Late prenatal care      Name: NORTH NELSON      Apgar1: 9  Apgar5: 9   7 Para 20   3.204 kg (7 lb 1 oz) F Vag-Spont None N ALLEN      Birth Comments: moderate meconium stained skin       Name: DAVID NELSON      Apgar1: 8  Apgar5: 9   6 Para 17   2.336 kg (5 lb 2.4 oz) M Vag-Spont None  ALLEN      Complications: Precipitate labor, Precipitous delivery      Name: NORTH NELSON      Apgar1: 7  Apgar5: 8   5  16   2.676 kg (5 lb 14.4 oz) F Vag-Spont None  ALLEN      Complications: Precipitous delivery, Precipitate labor      Name: DAVID NELSON      Apgar1: 7  Apgar5: 7   4  /15    M    ALLEN   3 Term 07    M Vag-Spont   ALLEN   2 Term 05    M Vag-Spont   ALLEN   1 AB              Past Medical History:   Diagnosis Date    Anxiety     Depression     Drug abuse      No past surgical history on file.    (Not in a hospital admission)      Review of patient's allergies indicates:  No Known Allergies     Family History       Problem Relation (Age of Onset)    Hypertension Father    Ovarian cancer Maternal Grandmother    Stroke Father          Tobacco Use    Smoking status: Every Day     Types: Cigarettes    Smokeless tobacco: Never   Substance and Sexual Activity    Alcohol use: No    Drug use: Not Currently     Types: Methamphetamines    Sexual activity: Yes     Partners: Male     Birth control/protection: None     Comment: single     Review of Systems   Constitutional:  Negative for fever.   Respiratory:   Negative for shortness of breath.    Cardiovascular:  Negative for chest pain.   Gastrointestinal:  Negative for abdominal pain, nausea and vomiting.   Endocrine: Negative for hot flashes.   Genitourinary:  Negative for vaginal bleeding and vaginal discharge.   Integumentary:  Negative for breast mass, nipple discharge and breast skin changes.   Neurological:  Negative for headaches.   Hematological:  Does not bruise/bleed easily.   Psychiatric/Behavioral:  Negative for depression.    Breast: Negative for mass, mastodynia, nipple discharge and skin changes     Objective:     Vital Signs (Most Recent):  Pulse: 81 (11/28/23 0135)  BP: (!) 150/85 (11/28/23 0135)  SpO2: 99 % (11/28/23 0135) Vital Signs (24h Range):  Temp:  [98.2 °F (36.8 °C)] 98.2 °F (36.8 °C)  Pulse:  [] 81  Resp:  [18] 18  SpO2:  [99 %-100 %] 99 %  BP: (125-167)/(66-94) 150/85        There is no height or weight on file to calculate BMI.    FHT: 140 Cat 1 (reassuring)  TOCO:  quiet     Physical Exam:   Constitutional: She is oriented to person, place, and time. She appears well-developed and well-nourished.       Cardiovascular:  Normal rate.             Pulmonary/Chest: Effort normal.        Abdominal: Soft. She exhibits no distension.                 Neurological: She is alert and oriented to person, place, and time.    Skin: Skin is warm and dry.    Psychiatric: She has a normal mood and affect. Her behavior is normal. Thought content normal.        Cervix:  Dilation:  1  Effacement:  50%  Station: -3  Presentation: Vertex   SVE per outside hospital, visually 1 cm    Significant Labs:  Lab Results   Component Value Date    GROUPTRH A NEG 11/27/2023    HEPBSAG Non-reactive 08/19/2021       Recent Lab Results         11/27/23 2204   11/27/23 2040        Benzodiazepines   Negative       Methadone metabolites   Negative       Phencyclidine   Negative       Rupture of Membrane   Positive  Comment: The test may report positive results in patients  with  intact membranes and therefore decisions to induce   labor should not be based solely on the ROM plus test.  The negative result should also be used in conjunction   with information available from clinical evaluation and   other diagnostic procedures.          Amphetamine Screen, Ur   Presumptive Positive       Appearance, UA   Clear       Bacteria, UA   Rare       Barbiturate Screen, Ur   Negative       Baso # 0.11         Basophil % 0.7         Bilirubin (UA)   Negative       Cocaine (Metab.)   Negative       Color, UA   Soha       Creatinine, Urine   27.6       Differential Method Automated         Eos # 0.4         Eosinophil % 2.3         Glucose, UA   Negative       Gran # (ANC) 12.4         Gran % 76.2         Group & Rh A NEG         Hematocrit 29.4         Hemoglobin 9.0         HIV Rapid Testing Non-Reactive  Comment: Interpretation: Negative for HIV-1 and HIV-2 antibodies.         Hyaline Casts, UA   0       Immature Grans (Abs) 0.35  Comment: Mild elevation in immature granulocytes is non specific and   can be seen in a variety of conditions including stress response,   acute inflammation, trauma and pregnancy. Correlation with other   laboratory and clinical findings is essential.           Immature Granulocytes 2.2         INDIRECT JAYY NEG         Ketones, UA   Negative       Leukocytes, UA   Negative       Lymph # 1.9         Lymph % 11.9         MCH 26.1         MCHC 30.6         MCV 85         Microscopic Comment   SEE COMMENT  Comment: Other formed elements not mentioned in the report are not   present in the microscopic examination.          Mono # 1.1         Mono % 6.7         MPV 9.9         NITRITE UA   Negative       nRBC 0         Occult Blood UA   3+       Opiate Scrn, Ur   Negative       pH, UA   7.0       Platelet Count 410         Protein, UA   1+  Comment: Recommend a 24 hour urine protein or a urine   protein/creatinine ratio if globulin induced proteinuria is  clinically  suspected.         RBC 3.45         RBC, UA   50       RDW 15.6         Specific [place], UA   1.015       Specimen Outdate 11/30/2023 23:59         Specimen UA   Urine, Clean Catch       Squam Epithel, UA   1       Marijuana (THC) Metabolite   Negative       Toxicology Information   SEE COMMENT  Comment: This screen includes the following classes of drugs at the listed   cut-off:    Benzodiazepines 200 ng/ml  Methadone 300 ng/ml  Cocaine metabolite 300 ng/ml  Opiates 300 ng/ml  Barbiturates 200 ng/ml  Amphetamines 1000 ng/ml  Marijuana metabs (THC) 50 ng/ml  Phencyclidine (PCP) 25 ng/ml    This is a screening test. If results do not correlate with clinical   presentation, then a confirmatory send out test is advised.     This report is intended for use in clinical monitoring and management   of   patients. It is not intended for use in employment related drug   testing.         UROBILINOGEN UA   Negative       WBC, UA   1       WBC 16.19

## 2023-11-28 NOTE — ASSESSMENT & PLAN NOTE
- ROM+ positive  - collect prenatal labs, UDS, GBS, urine culture  - will administer  betamethasone 12mg x 2 doses q24 hours  - start magnesium 6g bolus/2g per hour  - administer azithromycin 1g orally once and ampillicin 2g/hour q6 hours    Medication requested: Lunesta  Last office visit: 2/14/22  Next office visit: 7/7/22  Last refill given: 1/13/22 # 60 with 1 refill  Last refills picked up from pharmacy: 2/9/22 & 1/13/22  Contract present: Yes  Date of contract: 5/13/21  Last urine drug screen: None     Is the patient due for refill of this medications: Yes  Scripts prepped.  PDMP review: Criteria met.   Script prepped.   Forwarded to Physician/VISHNU for signature.

## 2023-11-29 LAB
BACTERIA UR CULT: NORMAL
BACTERIA UR CULT: NORMAL
C TRACH DNA SPEC QL NAA+PROBE: NOT DETECTED
FERRITIN SERPL-MCNC: 17 NG/ML (ref 20–300)
HCV AB SERPL QL IA: NEGATIVE
IRON SERPL-MCNC: 63 UG/DL (ref 30–160)
N GONORRHOEA DNA SPEC QL NAA+PROBE: NOT DETECTED
SATURATED IRON: 10 % (ref 20–50)
TOTAL IRON BINDING CAPACITY: 648 UG/DL (ref 250–450)
TRANSFERRIN SERPL-MCNC: 438 MG/DL (ref 200–375)
TRANSFERRIN SERPL-MCNC: 438 MG/DL (ref 200–375)

## 2023-11-29 PROCEDURE — 63600175 PHARM REV CODE 636 W HCPCS: Performed by: GENERAL PRACTICE

## 2023-11-29 PROCEDURE — 99232 SBSQ HOSP IP/OBS MODERATE 35: CPT | Mod: 25,,, | Performed by: OBSTETRICS & GYNECOLOGY

## 2023-11-29 PROCEDURE — 25000003 PHARM REV CODE 250: Performed by: GENERAL PRACTICE

## 2023-11-29 PROCEDURE — 36415 COLL VENOUS BLD VENIPUNCTURE: CPT

## 2023-11-29 PROCEDURE — 84466 ASSAY OF TRANSFERRIN: CPT

## 2023-11-29 PROCEDURE — 59025 FETAL NON-STRESS TEST: CPT | Mod: 26,,, | Performed by: OBSTETRICS & GYNECOLOGY

## 2023-11-29 PROCEDURE — 99232 PR SUBSEQUENT HOSPITAL CARE,LEVL II: ICD-10-PCS | Mod: 25,,, | Performed by: OBSTETRICS & GYNECOLOGY

## 2023-11-29 PROCEDURE — 11000001 HC ACUTE MED/SURG PRIVATE ROOM

## 2023-11-29 PROCEDURE — 83020 HEMOGLOBIN ELECTROPHORESIS: CPT

## 2023-11-29 PROCEDURE — 25000003 PHARM REV CODE 250

## 2023-11-29 PROCEDURE — 82728 ASSAY OF FERRITIN: CPT

## 2023-11-29 PROCEDURE — 83540 ASSAY OF IRON: CPT

## 2023-11-29 PROCEDURE — 59025 PR FETAL 2N-STRESS TEST: ICD-10-PCS | Mod: 26,,, | Performed by: OBSTETRICS & GYNECOLOGY

## 2023-11-29 PROCEDURE — 86803 HEPATITIS C AB TEST: CPT

## 2023-11-29 RX ORDER — IBUPROFEN 200 MG
1 TABLET ORAL DAILY
Status: DISCONTINUED | OUTPATIENT
Start: 2023-11-30 | End: 2023-12-04 | Stop reason: HOSPADM

## 2023-11-29 RX ADMIN — AMPICILLIN SODIUM 2 G: 2 INJECTION, POWDER, FOR SOLUTION INTRAVENOUS at 07:11

## 2023-11-29 RX ADMIN — AMPICILLIN SODIUM 2 G: 2 INJECTION, POWDER, FOR SOLUTION INTRAVENOUS at 01:11

## 2023-11-29 RX ADMIN — DEXAMETHASONE SODIUM PHOSPHATE 6 MG: 4 INJECTION INTRA-ARTICULAR; INTRALESIONAL; INTRAMUSCULAR; INTRAVENOUS; SOFT TISSUE at 10:11

## 2023-11-29 RX ADMIN — FERROUS SULFATE TAB 325 MG (65 MG ELEMENTAL FE) 1 EACH: 325 (65 FE) TAB at 08:11

## 2023-11-29 RX ADMIN — PRENATAL VIT W/ FE FUMARATE-FA TAB 27-0.8 MG 1 TABLET: 27-0.8 TAB at 08:11

## 2023-11-29 NOTE — PLAN OF CARE
Problem:  Fall Injury Risk  Goal: Absence of Fall, Infant Drop and Related Injury  Outcome: Ongoing, Progressing     Problem: Adult Inpatient Plan of Care  Goal: Plan of Care Review  Outcome: Ongoing, Progressing  Goal: Patient-Specific Goal (Individualized)  Outcome: Ongoing, Progressing  Goal: Absence of Hospital-Acquired Illness or Injury  Outcome: Ongoing, Progressing  Goal: Optimal Comfort and Wellbeing  Outcome: Ongoing, Progressing  Goal: Readiness for Transition of Care  Outcome: Ongoing, Progressing     Problem: Infection  Goal: Absence of Infection Signs and Symptoms  Outcome: Ongoing, Progressing     Problem: Prelabor Rupture of Membranes  Goal: Delayed Delivery with Absence of Infection  Outcome: Ongoing, Progressing

## 2023-11-29 NOTE — PROGRESS NOTES
"Maternal Fetal Medicine  Progress Note          Subjective:    Nery Brown is a 38 y.o.  at unknown gestational age (EGA 28w6d) admitted for PPROM. Please see H&P for details regarding presentation at admission. This pregnancy is complicated by history of substance abuse (Utox positive for methamphetamines, last used two days ago, denies other drug or alcohol use), poor social situation (patient currently experiencing homelessness, living with a friend and does not have custody of her other children), history of PTD x2, no prenatal care, gHTN, anemia, and Rh negative status.    Interim HPI: Patient reports feeling well overnight. Denies contractions or vaginal bleeding. Reports continued leakage of small amounts of fluid, unchanged. Reports normal fetal movement. Denies F/C.     Medical, Surgical, Social, Family, and Obstetric History: reviewed in chart  Current Medications:    [START ON 2023] amoxicillin  500 mg Oral Q8H    ampicillin IVPB  2 g Intravenous Q6H    [START ON 2023] azithromycin  1,000 mg Oral Daily    dexAMETHasone  6 mg Intramuscular Q12H    ferrous sulfate  1 tablet Oral Daily    prenatal vitamin  1 tablet Oral Daily    0.9%  NaCl infusion (for blood administration), acetaminophen, calcium gluconate, diphenhydrAMINE, diphenhydrAMINE, ondansetron, prochlorperazine, senna-docusate 8.6-50 mg, simethicone, sodium chloride 0.9%   Objective:   /63   Pulse 76   Temp 98.1 °F (36.7 °C) (Oral)   Resp 12   Ht 5' 6" (1.676 m)   Wt 61.7 kg (136 lb 2.1 oz)   LMP  (LMP Unknown)   SpO2 99%   Breastfeeding No   BMI 21.97 kg/m²     Focused Physical Examination   General: well developed, no acute distress  Pulmonary: respiratory effort normal with no retractions  Abdomen: gravid, no fundal tenderness  Cervix: deferred this AM    Fetal Monitoring - PM NST   EFM: 135 baseline/ moderate variability/+ accels/- decels  Tocometer: quiet     Lab Results  CBC   Recent Labs   Lab " 23  2204   WBC 16.19*   HGB 9.0*   HCT 29.4*   MCV 85        CMP   Recent Labs   Lab 23  0212   *   K 4.0      CO2 19*   BUN 8   CREATININE 0.6      PROT 6.7   BILITOT 0.1   ALKPHOS 106   ALT 16   AST 24      ROM +: positive  Utox: amphetamines    GC/CT, affirm pending      Assessment:   38 y.o.  at 28w1d admitted for PPROM   Plan:   PPROM  - Remains afebrile, VSS   - Continue PPROM abx, currently day 3   - Ampicillin 2g Q6H x48H followed by amoxicillin 500 TID for 10 total days of therapy   - Azithromycin 1g D1 and D5  - Continue steroids, 1st dose of BMZ @2300 , will give DMX x2 to complete course   - As monitoring has remained reassuring will transition to NST BID   - Regular diet   - Discontinue MgSO4. Will restart if delivery is imminent   - Continue to monitor closely for signs of PTL, IAI  - Goal delivery 34wga, sooner if indicated    Gestational Hypertension   - Vitals as above   - Labs on admit   - CBC:   - Cr 0.6, AST/ALT 24/  - P:C not collected as patient is grossly ruptured   - Asymptomatic   - Monitor symptoms and labs closely     Anemia   - H/H on admit , normocytic   - Asymptomatic   - Iron/colace   - 2u pRBC held due to high hemorrhage risk   - Iron studies and electrophoresis pending     Rh Negative   - Rhogam administered     History of  Delivery   -  delivery 2016 and 2017 at unknown GA, however, infants were 5# so presumed approx 32wga     History of Substance Abuse   - Utox at [place] positive for amphetamines   - Patient reports last using methamphetamines 2-3 days ago  - Currently denies withdrawal symptoms, denies ever experiencing withdrawal. Will continue to monitor closely   - Social work consulted     Poor Social Situation  - Patient currently experiencing homelessness and staying with a friend   - She does not have custody of her other 7 children   - Patient wishes to parent this child   - Social work  consulted     Unwanted Fertility  - Patient desires postpartum tubal ligation  - Medicaid and [facility]/27     28 Weeks Gestation   - Primary OBGYN: none  - Growth US 11/28: 1200g consistent with 28w0d gestation. GA adjusted accordingly    - Will repeat growth in 3 weeks   - Glucola: needs    - Will complete approximately 1 week after steroid course   - Needs Tdap, flu vaccines   - PNV  - Prenatal labs collected and pending       Sandra Langford MD  OBGYN, PGY-3

## 2023-11-29 NOTE — PLAN OF CARE
SW met with pt at bedside and explained SW role. Pt verbalized understanding. SW begun to ask questions such as verifying information. Pt reports, the current address on file is the address of last FOB. According to pt, she has moved away from that address, but she does not have a stable living place currently. Pt reports she has a sister in [place] and her parents is near in the surrounding area. Pt reports she plans to stay with parents after the baby is born. Pt then asked, why are you asking me these questions, are you trying to take my baby away? Pt became defensive. SW informed pt, SW verifying information listed in the chart to have a plan in place once baby is born. Pt became more upset and expressed she did not have to go through this type of questioning during last pregnancy which was 8 months ago. According to pt, DCFS did not take custody of baby and reports baby is with father.      SW educated pt on positive utox and DCFS. According to pt, she no longer wants to speak with SW. SW verbalized understanding and explained to pt, she can refuse to speak SW, but she will need to speak with DCFS if DCFS is called. Pt expressed she was not speaking with no one from DCFS and once she gives birth, she is leaving this hospital and going out of state.        11/29/23 1102   OB Discharge Planning Assessment   Assessment Type Discharge Planning Assessment   Source of Information patient   Verified Demographic and Insurance Information Yes   Insurance Medicaid   People in Home   (Pt does not have a steady home.)   Relationship Status None     SW unable to complete full assessment since pt asked SW to leave the room.

## 2023-11-30 LAB
ABO + RH BLD: ABNORMAL
ALBUMIN SERPL BCP-MCNC: 2 G/DL (ref 3.5–5.2)
ALP SERPL-CCNC: 93 U/L (ref 55–135)
ALT SERPL W/O P-5'-P-CCNC: 32 U/L (ref 10–44)
ANION GAP SERPL CALC-SCNC: 6 MMOL/L (ref 8–16)
AST SERPL-CCNC: 37 U/L (ref 10–40)
BACTERIA SPEC AEROBE CULT: NORMAL
BACTERIA SPEC AEROBE CULT: NORMAL
BACTERIAL VAGINOSIS DNA: NEGATIVE
BILIRUB SERPL-MCNC: 0.1 MG/DL (ref 0.1–1)
BLD GP AB SCN CELLS X3 SERPL QL: ABNORMAL
BLOOD GROUP ANTIBODIES SERPL: NORMAL
BUN SERPL-MCNC: 12 MG/DL (ref 6–20)
CALCIUM SERPL-MCNC: 8.4 MG/DL (ref 8.7–10.5)
CANDIDA GLABRATA DNA: NEGATIVE
CANDIDA KRUSEI DNA: NEGATIVE
CANDIDA RRNA VAG QL PROBE: NEGATIVE
CHLORIDE SERPL-SCNC: 110 MMOL/L (ref 95–110)
CO2 SERPL-SCNC: 19 MMOL/L (ref 23–29)
CREAT SERPL-MCNC: 0.6 MG/DL (ref 0.5–1.4)
EST. GFR  (NO RACE VARIABLE): >60 ML/MIN/1.73 M^2
GLUCOSE SERPL-MCNC: 117 MG/DL (ref 70–110)
MAGNESIUM SERPL-MCNC: 1.7 MG/DL (ref 1.6–2.6)
POTASSIUM SERPL-SCNC: 3.1 MMOL/L (ref 3.5–5.1)
PROT SERPL-MCNC: 5.4 G/DL (ref 6–8.4)
SODIUM SERPL-SCNC: 135 MMOL/L (ref 136–145)
SPECIMEN OUTDATE: ABNORMAL
T VAGINALIS RRNA GENITAL QL PROBE: NEGATIVE

## 2023-11-30 PROCEDURE — 63600175 PHARM REV CODE 636 W HCPCS: Performed by: GENERAL PRACTICE

## 2023-11-30 PROCEDURE — 25000003 PHARM REV CODE 250: Performed by: GENERAL PRACTICE

## 2023-11-30 PROCEDURE — 25000003 PHARM REV CODE 250

## 2023-11-30 PROCEDURE — 59025 FETAL NON-STRESS TEST: CPT | Mod: 26,,, | Performed by: OBSTETRICS & GYNECOLOGY

## 2023-11-30 PROCEDURE — 11000001 HC ACUTE MED/SURG PRIVATE ROOM

## 2023-11-30 PROCEDURE — 80053 COMPREHEN METABOLIC PANEL: CPT | Performed by: GENERAL PRACTICE

## 2023-11-30 PROCEDURE — 99232 PR SUBSEQUENT HOSPITAL CARE,LEVL II: ICD-10-PCS | Mod: 25,,, | Performed by: OBSTETRICS & GYNECOLOGY

## 2023-11-30 PROCEDURE — 86901 BLOOD TYPING SEROLOGIC RH(D): CPT

## 2023-11-30 PROCEDURE — 99232 SBSQ HOSP IP/OBS MODERATE 35: CPT | Mod: 25,,, | Performed by: OBSTETRICS & GYNECOLOGY

## 2023-11-30 PROCEDURE — 83735 ASSAY OF MAGNESIUM: CPT | Performed by: GENERAL PRACTICE

## 2023-11-30 PROCEDURE — 59025 PR FETAL 2N-STRESS TEST: ICD-10-PCS | Mod: 26,,, | Performed by: OBSTETRICS & GYNECOLOGY

## 2023-11-30 PROCEDURE — 86870 RBC ANTIBODY IDENTIFICATION: CPT

## 2023-11-30 RX ORDER — CYCLOBENZAPRINE HCL 5 MG
5 TABLET ORAL 3 TIMES DAILY PRN
Status: DISCONTINUED | OUTPATIENT
Start: 2023-11-30 | End: 2023-12-04 | Stop reason: HOSPADM

## 2023-11-30 RX ORDER — CYCLOBENZAPRINE HCL 5 MG
5 TABLET ORAL ONCE
Status: COMPLETED | OUTPATIENT
Start: 2023-11-30 | End: 2023-11-30

## 2023-11-30 RX ADMIN — AMOXICILLIN 500 MG: 250 CAPSULE ORAL at 02:11

## 2023-11-30 RX ADMIN — AMOXICILLIN 500 MG: 250 CAPSULE ORAL at 05:11

## 2023-11-30 RX ADMIN — CYCLOBENZAPRINE HYDROCHLORIDE 5 MG: 5 TABLET, FILM COATED ORAL at 08:11

## 2023-11-30 RX ADMIN — AMOXICILLIN 500 MG: 250 CAPSULE ORAL at 09:11

## 2023-11-30 RX ADMIN — ACETAMINOPHEN 650 MG: 325 TABLET, FILM COATED ORAL at 02:11

## 2023-11-30 RX ADMIN — CYCLOBENZAPRINE HYDROCHLORIDE 5 MG: 5 TABLET, FILM COATED ORAL at 04:11

## 2023-11-30 RX ADMIN — PRENATAL VIT W/ FE FUMARATE-FA TAB 27-0.8 MG 1 TABLET: 27-0.8 TAB at 12:11

## 2023-11-30 RX ADMIN — FERROUS SULFATE TAB 325 MG (65 MG ELEMENTAL FE) 1 EACH: 325 (65 FE) TAB at 12:11

## 2023-11-30 RX ADMIN — AMPICILLIN SODIUM 2 G: 2 INJECTION, POWDER, FOR SOLUTION INTRAVENOUS at 02:11

## 2023-11-30 NOTE — NURSING
11/30/2023 0238 Pt reported leg pain on both legs. RN initiated SCDs and offered pain medication, see MAR for admin. MD notified.   0406 Pt went to the bathroom, staff assist alarm initiated per pt. Rn found pt standing and holding next to the handle bars reporting severe leg pain on both legs, facial grimace and difficulty of walking observed by RN. RN assisted pt to bed, vital signs taken       11/30/23 0413   Vital Signs   Temp 98.4 °F (36.9 °C)   Temp Source Oral   Pulse 106   Heart Rate Source Monitor   Resp 15   Pulse Oximetry Type Intermittent   Device (Oxygen Therapy) room air   /65   MAP (mmHg) 90   BP Location Right arm   BP Method Automatic   Patient Position Sitting     MD came for further assessment. Muscle relaxant given as ordered. Other immediate intervention carried out.   Patient verbalize relief from leg pain. Sleep and relaxation promoted.   Patient safety maintained.

## 2023-11-30 NOTE — NURSING
"RN at BS @ 0800 for NST, VS, and daily weight. Pt stated she "wanted to sleep in more before doing any of that". Pt instructed to call RN when ready.       RN back at BS @ 0840 to check on patient and see if pt ready for morning tasks and medications. Pt requesting more time to sleep and refusing interventions at this time. MD notified.  "

## 2023-11-30 NOTE — PROGRESS NOTES
"Maternal Fetal Medicine  Progress Note          Subjective:    Nery Brown is a 38 y.o.  at unknown gestational age (EGA 28w6d) admitted for PPROM. Please see H&P for details regarding presentation at admission. This pregnancy is complicated by history of substance abuse (Utox positive for methamphetamines, last used two days ago, denies other drug or alcohol use), poor social situation (patient currently experiencing homelessness, living with a friend and does not have custody of her other children), history of PTD x2, no prenatal care, gHTN, anemia, and Rh negative status.    Interim HPI: Denies contractions or vaginal bleeding. Denies loss of fluid. Reports normal fetal movement. Denies F/C. Patient did have an episode of leg pain overnight, LE dopplers negative. Patient declined to discuss leg pain this AM.    Medical, Surgical, Social, Family, and Obstetric History: reviewed in chart  Current Medications:    amoxicillin  500 mg Oral Q8H    [START ON 2023] azithromycin  1,000 mg Oral Daily    ferrous sulfate  1 tablet Oral Daily    nicotine  1 patch Transdermal Daily    prenatal vitamin  1 tablet Oral Daily    0.9%  NaCl infusion (for blood administration), acetaminophen, calcium gluconate, diphenhydrAMINE, diphenhydrAMINE, ondansetron, prochlorperazine, senna-docusate 8.6-50 mg, simethicone, sodium chloride 0.9%   Objective:   /65 (BP Location: Right arm, Patient Position: Sitting)   Pulse 95   Temp 98.4 °F (36.9 °C) (Oral)   Resp 15   Ht 5' 6" (1.676 m)   Wt 71.6 kg (157 lb 13.6 oz)   LMP  (LMP Unknown)   SpO2 97%   Breastfeeding No   BMI 25.48 kg/m²     Focused Physical Examination   General: well developed, no acute distress  Pulmonary: respiratory effort normal with no retractions  Abdomen: gravid, no fundal tenderness  Cervix: deferred this AM    Fetal Monitoring - PM NST   EFM: 130 baseline/ moderate variability/+ accels/- decels  Tocometer: quiet     Lab Results  CBC " Transported to hospice and placed back on vent with previous settings   Recent Labs   Lab 23  2204   WBC 16.19*   HGB 9.0*   HCT 29.4*   MCV 85          CMP   Recent Labs   Lab 23  0212 23  0452   * 135*   K 4.0 3.1*    110   CO2 19* 19*   BUN 8 12   CREATININE 0.6 0.6    117*   PROT 6.7 5.4*   BILITOT 0.1 0.1   ALKPHOS 106 93   ALT 16 32   AST 24 37   MG  --  1.7        ROM +: positive  Utox: amphetamines    GC/CT, affirm pending      Assessment:   38 y.o.  at 28w2d admitted for PPROM   Plan:   PPROM  - Remains afebrile, VSS   - Continue PPROM abx, currently day 3   - Ampicillin 2g Q6H x48H followed by amoxicillin 500 TID for 10 total days of therapy   - Azithromycin 1g D1 and D5  - Continue steroids, 1st dose of BMZ @2300 , will give DMX x2 to complete course   - As monitoring has remained reassuring will transition to NST BID   - Regular diet   - Discontinue MgSO4. Will restart if delivery is imminent   - Continue to monitor closely for signs of PTL, IAI  - Goal delivery 34wga, sooner if indicated    Gestational Hypertension   - Vitals as above   - Labs on admit   - CBC:   - Cr 0.6, AST/ALT   - P:C not collected as patient is grossly ruptured   - Asymptomatic   - Monitor symptoms and labs closely     Anemia   - H/H on admit , normocytic   - Asymptomatic   - Iron/colace   - 2u pRBC held due to high hemorrhage risk   - Iron studies and electrophoresis pending     Rh Negative   - Rhogam administered     History of  Delivery   -  delivery 2016 and 2017 at unknown GA, however, infants were 5# so presumed approx 32wga     History of Substance Abuse   - Utox at [place] positive for amphetamines   - Patient reports last using methamphetamines 2-3 days ago  - Currently denies withdrawal symptoms, denies ever experiencing withdrawal. Will continue to monitor closely   - Social work consulted     Poor Social Situation  - Patient currently experiencing homelessness and staying with a friend   - She  does not have custody of her other 7 children   - Patient wishes to parent this child   - Social work consulted     Unwanted Fertility  - Patient desires postpartum tubal ligation  - Medicaid and [facility] consents signed 11/27     Tobacco Use   - Patient is a current, everyday smoker  - Patients family to bring her nicotine gum as she declines patches     28 Weeks Gestation   - Primary OBGYN: none  - Growth US 11/28: 1200g consistent with 28w0d gestation. GA adjusted accordingly    - Will repeat growth in 3 weeks   - Glucola: needs    - Will complete approximately 1 week after steroid course   - Needs Tdap, flu vaccines   - PNV      Sandra Langford MD  OBGYN, PGY-3  ______________________________________  Fellow Attestation    I have reviewed and concur with the resident's history, physical, assessment, and plan. I have personally interviewed and examined the patient at bedside.   No changes to the plan as described above.     Now 28w2d admitted for the following:  Hospital Day: 3    NST: 135/mod/+acc/-dec. No ctx    NEHA Medellin MD  Maternal Fetal Medicine Fellow   PGY-5

## 2023-11-30 NOTE — PROGRESS NOTES
Pt requested to go outside for a walk with her sister but eventually revealed that she will go to smoke. Informed MD, offered nicotine patch as ordered, pt refused. Pt verbalized refuse of service/treatment and leave if she can't smoke immediately. Informed Dr. Boecking, MD will talk to the pt. Patient safety maintained.

## 2023-11-30 NOTE — CARE UPDATE
Resident to bedside for staff assist. Upon evaluation, patient was crying hysterically and complaining of severe leg cramps. She states she was on the way to the bathroom and could not walk. The leg pain started this morning but has been getting progressively worse. Patient reports a history of restless leg syndrome but has not had to take medication in the past. She reports this pain is slightly similar. She denies CP, SOB, or palpitations.     Temp:  [97.9 °F (36.6 °C)-98.5 °F (36.9 °C)] 98.5 °F (36.9 °C)  Pulse:  [61-98] 76  Resp:  [12-16] 15  SpO2:  [95 %-100 %] 97 %  BP: (109-136)/(56-71) 115/56     Physical Exam  Musculoskeletal:      Right lower leg: Tenderness present. No swelling. Edema (trace) present.      Left lower leg: Tenderness present. No swelling. Edema (trace) present.      Comments: No motor or sensory deficits.       Plan  - Patient afebrile, VSS  - CMP/Mg ordered  - LE US dopplers ordered to assess for DVT   - 1x dose of flexeril given for leg cramps  - PT ordered to assist patient with ambulation   - Will continue to monitor for worsening symptoms.

## 2023-11-30 NOTE — NURSING
RN at BS @ 1100 to awaken pt and ask to give morning medications, NST, and check vitals. Pt still refusing interventions and requesting to continue sleeping. MD aware.

## 2023-11-30 NOTE — PLAN OF CARE
Problem:  Fall Injury Risk  Goal: Absence of Fall, Infant Drop and Related Injury  2023 by Rea Oconnor RN  Outcome: Ongoing, Progressing  2023 by Rea Oconnor RN  Outcome: Ongoing, Progressing     Problem: Adult Inpatient Plan of Care  Goal: Plan of Care Review  2023 by Rea Oconnor RN  Outcome: Ongoing, Progressing  2023 by Rea Oconnor RN  Outcome: Ongoing, Progressing  Goal: Patient-Specific Goal (Individualized)  2023 by Rea Oconnor RN  Outcome: Ongoing, Progressing  2023 by Rea Oconnor RN  Outcome: Ongoing, Progressing  Goal: Absence of Hospital-Acquired Illness or Injury  2023 by Rea Oconnor RN  Outcome: Ongoing, Progressing  2023 by Rea Oconnor RN  Outcome: Ongoing, Progressing  Goal: Optimal Comfort and Wellbeing  2023 by Rea Oconnor RN  Outcome: Ongoing, Progressing  2023 by Rea Oconnor RN  Outcome: Ongoing, Progressing  Goal: Readiness for Transition of Care  2023 by Rea Oconnor RN  Outcome: Ongoing, Progressing  2023 by Rea Oconnor RN  Outcome: Ongoing, Progressing     Problem: Infection  Goal: Absence of Infection Signs and Symptoms  2023 by Rea Oconnor RN  Outcome: Ongoing, Progressing  2023 by Rea Oconnor RN  Outcome: Ongoing, Progressing     Problem: Prelabor Rupture of Membranes  Goal: Delayed Delivery with Absence of Infection  2023 by Rea Oconnor RN  Outcome: Ongoing, Progressing  2023 by Rea Oconnor RN  Outcome: Ongoing, Progressing     Problem: Maternal-Fetal Wellbeing  Goal: Optimal Maternal-Fetal Wellbeing  2023 by Rea Oconnor RN  Outcome: Ongoing, Progressing  2023 0625 by Rea Oconnor RN  Outcome: Ongoing, Progressing      Problem: Pain Acute  Goal: Acceptable Pain Control and Functional Ability  Outcome: Ongoing, Progressing

## 2023-11-30 NOTE — CARE UPDATE
PM NST    130bpm, moderate variability, +accels, -decels  TOCO: No contractions    Reactive and reassuring    Juju Stewart MD  OBGYN PGY-2

## 2023-11-30 NOTE — PT/OT/SLP PROGRESS
Physical Therapy Screen    Patient Name:  Nery Brown   MRN:  [id number]    Consult placed for bilateral leg pain. Upon entry to room, pt reporting leg pain is no longer present. Notes her pain was related to restless leg syndrome and is intermittent. Pt reports no need for therapy at this time. Unwilling to get out of bed secondary to fatigue and no longer having leg pain to be evaluated. Pt educated on ability to re-consult PT if her leg pain were to return while in hospital.     Recommended Plan:  Patient to be discharged to home with assistance as needed. No further PT needed at this time.     PT Received On: 11/30/2023  PT Start Time:  1453  PT Stop Time: 1457  PT Total Time (min):  4      AM-PAC 6 CLICK MOBILITY  How much help from another person does this patient currently need?   1 = Unable, Total/Dependent Assistance  2 = A lot, Maximum/Moderate Assistance  3 = A little, Minimum/Contact Guard/Supervision  4 = None, Modified [place]/Independent    Basic Mobility Total Score: 24

## 2023-12-01 LAB
ALLENS TEST: ABNORMAL
ALLENS TEST: NORMAL
ANISOCYTOSIS BLD QL SMEAR: SLIGHT
ANISOCYTOSIS BLD QL SMEAR: SLIGHT
BASOPHILS # BLD AUTO: ABNORMAL K/UL (ref 0–0.2)
BASOPHILS # BLD AUTO: ABNORMAL K/UL (ref 0–0.2)
BASOPHILS NFR BLD: 0 % (ref 0–1.9)
BASOPHILS NFR BLD: 1 % (ref 0–1.9)
DIFFERENTIAL METHOD: ABNORMAL
DIFFERENTIAL METHOD: ABNORMAL
EOSINOPHIL # BLD AUTO: ABNORMAL K/UL (ref 0–0.5)
EOSINOPHIL # BLD AUTO: ABNORMAL K/UL (ref 0–0.5)
EOSINOPHIL NFR BLD: 0 % (ref 0–8)
EOSINOPHIL NFR BLD: 0 % (ref 0–8)
ERYTHROCYTE [DISTWIDTH] IN BLOOD BY AUTOMATED COUNT: 16.2 % (ref 11.5–14.5)
ERYTHROCYTE [DISTWIDTH] IN BLOOD BY AUTOMATED COUNT: 16.3 % (ref 11.5–14.5)
HCO3 UR-SCNC: 22.2 MMOL/L (ref 12–29)
HCO3 UR-SCNC: 23.5 MMOL/L (ref 17–27)
HCT VFR BLD AUTO: 26.6 % (ref 37–48.5)
HCT VFR BLD AUTO: 28.1 % (ref 37–48.5)
HCT VFR BLD CALC: 49 %PCV (ref 36–54)
HCT VFR BLD CALC: 49 %PCV (ref 36–54)
HGB A2 MFR BLD HPLC: 2.4 % (ref 2.2–3.2)
HGB BLD-MCNC: 17 G/DL
HGB BLD-MCNC: 17 G/DL
HGB BLD-MCNC: 8.1 G/DL (ref 12–16)
HGB BLD-MCNC: 8.7 G/DL (ref 12–16)
HGB FRACT BLD ELPH-IMP: NORMAL
HGB FRACT BLD ELPH-IMP: NORMAL
IMM GRANULOCYTES # BLD AUTO: ABNORMAL K/UL (ref 0–0.04)
IMM GRANULOCYTES # BLD AUTO: ABNORMAL K/UL (ref 0–0.04)
IMM GRANULOCYTES NFR BLD AUTO: ABNORMAL % (ref 0–0.5)
IMM GRANULOCYTES NFR BLD AUTO: ABNORMAL % (ref 0–0.5)
LYMPHOCYTES # BLD AUTO: ABNORMAL K/UL (ref 1–4.8)
LYMPHOCYTES # BLD AUTO: ABNORMAL K/UL (ref 1–4.8)
LYMPHOCYTES NFR BLD: 17 % (ref 18–48)
LYMPHOCYTES NFR BLD: 4 % (ref 18–48)
MCH RBC QN AUTO: 26.1 PG (ref 27–31)
MCH RBC QN AUTO: 26.5 PG (ref 27–31)
MCHC RBC AUTO-ENTMCNC: 30.5 G/DL (ref 32–36)
MCHC RBC AUTO-ENTMCNC: 31 G/DL (ref 32–36)
MCV RBC AUTO: 86 FL (ref 82–98)
MCV RBC AUTO: 86 FL (ref 82–98)
METAMYELOCYTES NFR BLD MANUAL: 1 %
METAMYELOCYTES NFR BLD MANUAL: 2 %
MONOCYTES # BLD AUTO: ABNORMAL K/UL (ref 0.3–1)
MONOCYTES # BLD AUTO: ABNORMAL K/UL (ref 0.3–1)
MONOCYTES NFR BLD: 4 % (ref 4–15)
MONOCYTES NFR BLD: 5 % (ref 4–15)
MYELOCYTES NFR BLD MANUAL: 3 %
NEUTROPHILS NFR BLD: 74 % (ref 38–73)
NEUTROPHILS NFR BLD: 85 % (ref 38–73)
NEUTS BAND NFR BLD MANUAL: 1 %
NEUTS BAND NFR BLD MANUAL: 3 %
NRBC BLD-RTO: 1 /100 WBC
NRBC BLD-RTO: 3 /100 WBC
PCO2 BLDA: 54.3 MMHG (ref 27–49)
PCO2 BLDA: 58.4 MMHG (ref 32–66)
PH SMN: 7.21 [PH] (ref 7.18–7.38)
PH SMN: 7.22 [PH] (ref 7.25–7.45)
PLATELET # BLD AUTO: 307 K/UL (ref 150–450)
PLATELET # BLD AUTO: 324 K/UL (ref 150–450)
PLATELET BLD QL SMEAR: ABNORMAL
PLATELET BLD QL SMEAR: ABNORMAL
PMV BLD AUTO: 10.1 FL (ref 9.2–12.9)
PMV BLD AUTO: 10.1 FL (ref 9.2–12.9)
PO2 BLDA: 13 MMHG (ref 17–41)
PO2 BLDA: 9 MMHG (ref 6–31)
POC BE: -4 MMOL/L
POC BE: -6 MMOL/L
POC IONIZED CALCIUM: 1.39 MMOL/L (ref 1.06–1.42)
POC IONIZED CALCIUM: 1.41 MMOL/L (ref 1.06–1.42)
POC SATURATED O2: 11 %
POC SATURATED O2: 6 %
POC TCO2: 24 MMOL/L (ref 23–27)
POC TCO2: 25 MMOL/L (ref 23–27)
POLYCHROMASIA BLD QL SMEAR: ABNORMAL
POLYCHROMASIA BLD QL SMEAR: ABNORMAL
POTASSIUM BLD-SCNC: 3.8 MMOL/L (ref 3.5–5.1)
POTASSIUM BLD-SCNC: 4 MMOL/L (ref 3.5–5.1)
RBC # BLD AUTO: 3.1 M/UL (ref 4–5.4)
RBC # BLD AUTO: 3.28 M/UL (ref 4–5.4)
SAMPLE: ABNORMAL
SAMPLE: NORMAL
SITE: ABNORMAL
SITE: NORMAL
SODIUM BLD-SCNC: 138 MMOL/L (ref 136–145)
SODIUM BLD-SCNC: 139 MMOL/L (ref 136–145)
WBC # BLD AUTO: 15.7 K/UL (ref 3.9–12.7)
WBC # BLD AUTO: 21.31 K/UL (ref 3.9–12.7)

## 2023-12-01 PROCEDURE — 88307 PR  SURG PATH,LEVEL V: ICD-10-PCS | Mod: 26,,, | Performed by: PATHOLOGY

## 2023-12-01 PROCEDURE — 88307 TISSUE EXAM BY PATHOLOGIST: CPT | Performed by: PATHOLOGY

## 2023-12-01 PROCEDURE — 88307 TISSUE EXAM BY PATHOLOGIST: CPT | Mod: 26,,, | Performed by: PATHOLOGY

## 2023-12-01 PROCEDURE — 85027 COMPLETE CBC AUTOMATED: CPT | Performed by: STUDENT IN AN ORGANIZED HEALTH CARE EDUCATION/TRAINING PROGRAM

## 2023-12-01 PROCEDURE — 71000039 HC RECOVERY, EACH ADD'L HOUR: Performed by: OBSTETRICS & GYNECOLOGY

## 2023-12-01 PROCEDURE — 58611 LIGATE OVIDUCT(S) ADD-ON: CPT | Mod: ,,, | Performed by: OBSTETRICS & GYNECOLOGY

## 2023-12-01 PROCEDURE — 88302 PR  SURG PATH,LEVEL II: ICD-10-PCS | Mod: 26,,, | Performed by: PATHOLOGY

## 2023-12-01 PROCEDURE — 36415 COLL VENOUS BLD VENIPUNCTURE: CPT | Performed by: STUDENT IN AN ORGANIZED HEALTH CARE EDUCATION/TRAINING PROGRAM

## 2023-12-01 PROCEDURE — 59514 CESAREAN DELIVERY ONLY: CPT | Mod: AT,,, | Performed by: OBSTETRICS & GYNECOLOGY

## 2023-12-01 PROCEDURE — 25000003 PHARM REV CODE 250

## 2023-12-01 PROCEDURE — 88302 TISSUE EXAM BY PATHOLOGIST: CPT | Mod: 26,,, | Performed by: PATHOLOGY

## 2023-12-01 PROCEDURE — 63600175 PHARM REV CODE 636 W HCPCS: Performed by: STUDENT IN AN ORGANIZED HEALTH CARE EDUCATION/TRAINING PROGRAM

## 2023-12-01 PROCEDURE — 37000009 HC ANESTHESIA EA ADD 15 MINS: Mod: SZN

## 2023-12-01 PROCEDURE — 59514 PRA REAN DELIVERY ONLY: ICD-10-PCS | Mod: AA,,, | Performed by: ANESTHESIOLOGY

## 2023-12-01 PROCEDURE — 85007 BL SMEAR W/DIFF WBC COUNT: CPT | Mod: 91

## 2023-12-01 PROCEDURE — 59514 CESAREAN DELIVERY ONLY: CPT | Mod: AA,,, | Performed by: ANESTHESIOLOGY

## 2023-12-01 PROCEDURE — C1751 CATH, INF, PER/CENT/MIDLINE: HCPCS | Performed by: STUDENT IN AN ORGANIZED HEALTH CARE EDUCATION/TRAINING PROGRAM

## 2023-12-01 PROCEDURE — 99900059 HC C-SECTION ATTEND (STAT)

## 2023-12-01 PROCEDURE — 25000003 PHARM REV CODE 250: Performed by: STUDENT IN AN ORGANIZED HEALTH CARE EDUCATION/TRAINING PROGRAM

## 2023-12-01 PROCEDURE — 36004725 HC OB OR TIME LEV III - EA ADD 15 MIN: Mod: SZN

## 2023-12-01 PROCEDURE — 36415 COLL VENOUS BLD VENIPUNCTURE: CPT

## 2023-12-01 PROCEDURE — 58611 PR LIGATION,FALLOPIAN TUBE W/C-SECTION: ICD-10-PCS | Mod: ,,, | Performed by: OBSTETRICS & GYNECOLOGY

## 2023-12-01 PROCEDURE — 59514 PR CESAREAN DELIVERY ONLY: ICD-10-PCS | Mod: AT,,, | Performed by: OBSTETRICS & GYNECOLOGY

## 2023-12-01 PROCEDURE — 71000033 HC RECOVERY, INTIAL HOUR: Performed by: OBSTETRICS & GYNECOLOGY

## 2023-12-01 PROCEDURE — 36004724 HC OB OR TIME LEV III - 1ST 15 MIN: Performed by: OBSTETRICS & GYNECOLOGY

## 2023-12-01 PROCEDURE — 85007 BL SMEAR W/DIFF WBC COUNT: CPT | Performed by: STUDENT IN AN ORGANIZED HEALTH CARE EDUCATION/TRAINING PROGRAM

## 2023-12-01 PROCEDURE — 27200710 HC EPIDURAL INFUSION PUMP SET: Performed by: STUDENT IN AN ORGANIZED HEALTH CARE EDUCATION/TRAINING PROGRAM

## 2023-12-01 PROCEDURE — 37000008 HC ANESTHESIA 1ST 15 MINUTES: Performed by: OBSTETRICS & GYNECOLOGY

## 2023-12-01 PROCEDURE — 11000001 HC ACUTE MED/SURG PRIVATE ROOM

## 2023-12-01 PROCEDURE — 36004725 HC OB OR TIME LEV III - EA ADD 15 MIN: Performed by: OBSTETRICS & GYNECOLOGY

## 2023-12-01 PROCEDURE — 37000009 HC ANESTHESIA EA ADD 15 MINS: Performed by: OBSTETRICS & GYNECOLOGY

## 2023-12-01 PROCEDURE — 85027 COMPLETE CBC AUTOMATED: CPT | Mod: 91

## 2023-12-01 PROCEDURE — 88302 TISSUE EXAM BY PATHOLOGIST: CPT | Mod: 59 | Performed by: PATHOLOGY

## 2023-12-01 RX ORDER — MISOPROSTOL 200 UG/1
800 TABLET ORAL ONCE AS NEEDED
Status: DISCONTINUED | OUTPATIENT
Start: 2023-12-01 | End: 2023-12-04 | Stop reason: HOSPADM

## 2023-12-01 RX ORDER — FAMOTIDINE 10 MG/ML
20 INJECTION INTRAVENOUS
Status: COMPLETED | OUTPATIENT
Start: 2023-12-01 | End: 2023-12-01

## 2023-12-01 RX ORDER — CALCIUM GLUCONATE 98 MG/ML
1 INJECTION, SOLUTION INTRAVENOUS
Status: DISCONTINUED | OUTPATIENT
Start: 2023-12-01 | End: 2023-12-02

## 2023-12-01 RX ORDER — ONDANSETRON 8 MG/1
8 TABLET, ORALLY DISINTEGRATING ORAL EVERY 8 HOURS PRN
Status: DISCONTINUED | OUTPATIENT
Start: 2023-12-01 | End: 2023-12-04 | Stop reason: HOSPADM

## 2023-12-01 RX ORDER — FAMOTIDINE 10 MG/ML
INJECTION INTRAVENOUS
Status: DISPENSED
Start: 2023-12-01 | End: 2023-12-01

## 2023-12-01 RX ORDER — ACETAMINOPHEN 10 MG/ML
INJECTION, SOLUTION INTRAVENOUS
Status: DISCONTINUED | OUTPATIENT
Start: 2023-12-01 | End: 2023-12-01

## 2023-12-01 RX ORDER — ONDANSETRON 2 MG/ML
4 INJECTION INTRAMUSCULAR; INTRAVENOUS EVERY 6 HOURS PRN
Status: ACTIVE | OUTPATIENT
Start: 2023-12-01 | End: 2023-12-02

## 2023-12-01 RX ORDER — ACETAMINOPHEN 325 MG/1
650 TABLET ORAL EVERY 6 HOURS
Status: DISPENSED | OUTPATIENT
Start: 2023-12-01 | End: 2023-12-02

## 2023-12-01 RX ORDER — CARBOPROST TROMETHAMINE 250 UG/ML
250 INJECTION, SOLUTION INTRAMUSCULAR
Status: DISCONTINUED | OUTPATIENT
Start: 2023-12-01 | End: 2023-12-04 | Stop reason: HOSPADM

## 2023-12-01 RX ORDER — MORPHINE SULFATE 0.5 MG/ML
INJECTION, SOLUTION EPIDURAL; INTRATHECAL; INTRAVENOUS
Status: DISCONTINUED | OUTPATIENT
Start: 2023-12-01 | End: 2023-12-01

## 2023-12-01 RX ORDER — DEXMEDETOMIDINE HYDROCHLORIDE 100 UG/ML
INJECTION, SOLUTION INTRAVENOUS
Status: DISCONTINUED | OUTPATIENT
Start: 2023-12-01 | End: 2023-12-01

## 2023-12-01 RX ORDER — PROCHLORPERAZINE EDISYLATE 5 MG/ML
5 INJECTION INTRAMUSCULAR; INTRAVENOUS EVERY 6 HOURS PRN
Status: DISCONTINUED | OUTPATIENT
Start: 2023-12-01 | End: 2023-12-02

## 2023-12-01 RX ORDER — OXYCODONE HYDROCHLORIDE 10 MG/1
10 TABLET ORAL EVERY 4 HOURS PRN
Status: DISCONTINUED | OUTPATIENT
Start: 2023-12-01 | End: 2023-12-01

## 2023-12-01 RX ORDER — OXYCODONE HYDROCHLORIDE 10 MG/1
10 TABLET ORAL EVERY 4 HOURS PRN
Status: ACTIVE | OUTPATIENT
Start: 2023-12-01 | End: 2023-12-02

## 2023-12-01 RX ORDER — ACETAMINOPHEN 500 MG
1000 TABLET ORAL
Status: COMPLETED | OUTPATIENT
Start: 2023-12-01 | End: 2023-12-01

## 2023-12-01 RX ORDER — ONDANSETRON 2 MG/ML
4 INJECTION INTRAMUSCULAR; INTRAVENOUS EVERY 6 HOURS PRN
Status: DISCONTINUED | OUTPATIENT
Start: 2023-12-01 | End: 2023-12-04 | Stop reason: HOSPADM

## 2023-12-01 RX ORDER — HYDROCORTISONE 25 MG/G
CREAM TOPICAL 3 TIMES DAILY PRN
Status: DISCONTINUED | OUTPATIENT
Start: 2023-12-01 | End: 2023-12-04 | Stop reason: HOSPADM

## 2023-12-01 RX ORDER — OXYCODONE HYDROCHLORIDE 5 MG/1
5 TABLET ORAL EVERY 4 HOURS PRN
Status: DISPENSED | OUTPATIENT
Start: 2023-12-01 | End: 2023-12-02

## 2023-12-01 RX ORDER — PRENATAL WITH FERROUS FUM AND FOLIC ACID 3080; 920; 120; 400; 22; 1.84; 3; 20; 10; 1; 12; 200; 27; 25; 2 [IU]/1; [IU]/1; MG/1; [IU]/1; MG/1; MG/1; MG/1; MG/1; MG/1; MG/1; UG/1; MG/1; MG/1; MG/1; MG/1
1 TABLET ORAL DAILY
Status: DISCONTINUED | OUTPATIENT
Start: 2023-12-01 | End: 2023-12-04 | Stop reason: HOSPADM

## 2023-12-01 RX ORDER — DEXAMETHASONE SODIUM PHOSPHATE 4 MG/ML
INJECTION, SOLUTION INTRA-ARTICULAR; INTRALESIONAL; INTRAMUSCULAR; INTRAVENOUS; SOFT TISSUE
Status: DISCONTINUED | OUTPATIENT
Start: 2023-12-01 | End: 2023-12-01

## 2023-12-01 RX ORDER — SODIUM CITRATE AND CITRIC ACID MONOHYDRATE 334; 500 MG/5ML; MG/5ML
30 SOLUTION ORAL
Status: COMPLETED | OUTPATIENT
Start: 2023-12-01 | End: 2023-12-01

## 2023-12-01 RX ORDER — MIDAZOLAM HYDROCHLORIDE 1 MG/ML
INJECTION INTRAMUSCULAR; INTRAVENOUS
Status: DISCONTINUED | OUTPATIENT
Start: 2023-12-01 | End: 2023-12-01

## 2023-12-01 RX ORDER — AMOXICILLIN 250 MG
1 CAPSULE ORAL NIGHTLY PRN
Status: DISCONTINUED | OUTPATIENT
Start: 2023-12-01 | End: 2023-12-04 | Stop reason: HOSPADM

## 2023-12-01 RX ORDER — IBUPROFEN 400 MG/1
800 TABLET ORAL 3 TIMES DAILY
Status: DISCONTINUED | OUTPATIENT
Start: 2023-12-01 | End: 2023-12-04 | Stop reason: HOSPADM

## 2023-12-01 RX ORDER — MAGNESIUM SULFATE HEPTAHYDRATE 40 MG/ML
2 INJECTION, SOLUTION INTRAVENOUS CONTINUOUS
Status: DISCONTINUED | OUTPATIENT
Start: 2023-12-01 | End: 2023-12-01

## 2023-12-01 RX ORDER — FENTANYL CITRATE 50 UG/ML
INJECTION, SOLUTION INTRAMUSCULAR; INTRAVENOUS
Status: DISCONTINUED | OUTPATIENT
Start: 2023-12-01 | End: 2023-12-01

## 2023-12-01 RX ORDER — IBUPROFEN 400 MG/1
800 TABLET ORAL
Status: DISCONTINUED | OUTPATIENT
Start: 2023-12-02 | End: 2023-12-01

## 2023-12-01 RX ORDER — KETOROLAC TROMETHAMINE 30 MG/ML
30 INJECTION, SOLUTION INTRAMUSCULAR; INTRAVENOUS EVERY 6 HOURS
Status: DISPENSED | OUTPATIENT
Start: 2023-12-01 | End: 2023-12-02

## 2023-12-01 RX ORDER — SODIUM CITRATE AND CITRIC ACID MONOHYDRATE 334; 500 MG/5ML; MG/5ML
SOLUTION ORAL
Status: COMPLETED
Start: 2023-12-01 | End: 2023-12-01

## 2023-12-01 RX ORDER — METHYLERGONOVINE MALEATE 0.2 MG/ML
INJECTION INTRAVENOUS
Status: DISCONTINUED | OUTPATIENT
Start: 2023-12-01 | End: 2023-12-01

## 2023-12-01 RX ORDER — ONDANSETRON 2 MG/ML
INJECTION INTRAMUSCULAR; INTRAVENOUS
Status: DISCONTINUED | OUTPATIENT
Start: 2023-12-01 | End: 2023-12-01

## 2023-12-01 RX ORDER — SODIUM CHLORIDE, SODIUM LACTATE, POTASSIUM CHLORIDE, CALCIUM CHLORIDE 600; 310; 30; 20 MG/100ML; MG/100ML; MG/100ML; MG/100ML
INJECTION, SOLUTION INTRAVENOUS CONTINUOUS PRN
Status: DISCONTINUED | OUTPATIENT
Start: 2023-12-01 | End: 2023-12-01

## 2023-12-01 RX ORDER — PROCHLORPERAZINE EDISYLATE 5 MG/ML
5 INJECTION INTRAMUSCULAR; INTRAVENOUS EVERY 6 HOURS PRN
Status: DISCONTINUED | OUTPATIENT
Start: 2023-12-01 | End: 2023-12-01

## 2023-12-01 RX ORDER — SODIUM CHLORIDE, SODIUM LACTATE, POTASSIUM CHLORIDE, CALCIUM CHLORIDE 600; 310; 30; 20 MG/100ML; MG/100ML; MG/100ML; MG/100ML
INJECTION, SOLUTION INTRAVENOUS CONTINUOUS
Status: DISCONTINUED | OUTPATIENT
Start: 2023-12-01 | End: 2023-12-01

## 2023-12-01 RX ORDER — DOCUSATE SODIUM 100 MG/1
200 CAPSULE, LIQUID FILLED ORAL 2 TIMES DAILY
Status: DISCONTINUED | OUTPATIENT
Start: 2023-12-01 | End: 2023-12-04 | Stop reason: HOSPADM

## 2023-12-01 RX ORDER — OXYCODONE HYDROCHLORIDE 5 MG/1
5 TABLET ORAL EVERY 4 HOURS PRN
Status: DISCONTINUED | OUTPATIENT
Start: 2023-12-01 | End: 2023-12-01

## 2023-12-01 RX ORDER — OXYTOCIN/RINGER'S LACTATE 30/500 ML
95 PLASTIC BAG, INJECTION (ML) INTRAVENOUS CONTINUOUS
Status: DISCONTINUED | OUTPATIENT
Start: 2023-12-01 | End: 2023-12-04 | Stop reason: HOSPADM

## 2023-12-01 RX ORDER — BUPIVACAINE HYDROCHLORIDE 7.5 MG/ML
INJECTION, SOLUTION INTRASPINAL
Status: DISCONTINUED | OUTPATIENT
Start: 2023-12-01 | End: 2023-12-01

## 2023-12-01 RX ORDER — MAGNESIUM SULFATE HEPTAHYDRATE 40 MG/ML
6 INJECTION, SOLUTION INTRAVENOUS ONCE
Status: COMPLETED | OUTPATIENT
Start: 2023-12-01 | End: 2023-12-01

## 2023-12-01 RX ORDER — CEFAZOLIN 2 G/1
INJECTION, POWDER, FOR SOLUTION INTRAMUSCULAR; INTRAVENOUS
Status: DISCONTINUED | OUTPATIENT
Start: 2023-12-01 | End: 2023-12-01

## 2023-12-01 RX ORDER — ACETAMINOPHEN 325 MG/1
650 TABLET ORAL
Status: DISCONTINUED | OUTPATIENT
Start: 2023-12-01 | End: 2023-12-04 | Stop reason: HOSPADM

## 2023-12-01 RX ORDER — KETOROLAC TROMETHAMINE 30 MG/ML
INJECTION, SOLUTION INTRAMUSCULAR; INTRAVENOUS
Status: DISCONTINUED | OUTPATIENT
Start: 2023-12-01 | End: 2023-12-01

## 2023-12-01 RX ORDER — DIPHENHYDRAMINE HCL 25 MG
25 CAPSULE ORAL EVERY 6 HOURS PRN
Status: DISCONTINUED | OUTPATIENT
Start: 2023-12-01 | End: 2023-12-04 | Stop reason: HOSPADM

## 2023-12-01 RX ORDER — SODIUM CHLORIDE, SODIUM LACTATE, POTASSIUM CHLORIDE, CALCIUM CHLORIDE 600; 310; 30; 20 MG/100ML; MG/100ML; MG/100ML; MG/100ML
INJECTION, SOLUTION INTRAVENOUS CONTINUOUS
Status: DISCONTINUED | OUTPATIENT
Start: 2023-12-01 | End: 2023-12-04 | Stop reason: HOSPADM

## 2023-12-01 RX ORDER — NALBUPHINE HYDROCHLORIDE 10 MG/ML
5 INJECTION, SOLUTION INTRAMUSCULAR; INTRAVENOUS; SUBCUTANEOUS ONCE AS NEEDED
Status: COMPLETED | OUTPATIENT
Start: 2023-12-01 | End: 2023-12-01

## 2023-12-01 RX ORDER — KETOROLAC TROMETHAMINE 30 MG/ML
30 INJECTION, SOLUTION INTRAMUSCULAR; INTRAVENOUS
Status: DISCONTINUED | OUTPATIENT
Start: 2023-12-01 | End: 2023-12-01

## 2023-12-01 RX ORDER — METHYLERGONOVINE MALEATE 0.2 MG/ML
200 INJECTION INTRAVENOUS ONCE AS NEEDED
Status: DISCONTINUED | OUTPATIENT
Start: 2023-12-01 | End: 2023-12-04 | Stop reason: HOSPADM

## 2023-12-01 RX ORDER — OXYTOCIN 10 [USP'U]/ML
INJECTION, SOLUTION INTRAMUSCULAR; INTRAVENOUS
Status: DISCONTINUED | OUTPATIENT
Start: 2023-12-01 | End: 2023-12-01

## 2023-12-01 RX ORDER — PHENYLEPHRINE HYDROCHLORIDE 10 MG/ML
INJECTION INTRAVENOUS CONTINUOUS PRN
Status: DISCONTINUED | OUTPATIENT
Start: 2023-12-01 | End: 2023-12-01

## 2023-12-01 RX ORDER — DIPHENHYDRAMINE HYDROCHLORIDE 50 MG/ML
12.5 INJECTION INTRAMUSCULAR; INTRAVENOUS
Status: DISCONTINUED | OUTPATIENT
Start: 2023-12-01 | End: 2023-12-04 | Stop reason: HOSPADM

## 2023-12-01 RX ORDER — OXYTOCIN/RINGER'S LACTATE 30/500 ML
0-30 PLASTIC BAG, INJECTION (ML) INTRAVENOUS CONTINUOUS
Status: DISCONTINUED | OUTPATIENT
Start: 2023-12-01 | End: 2023-12-01

## 2023-12-01 RX ORDER — TALC
6 POWDER (GRAM) TOPICAL NIGHTLY PRN
Status: DISCONTINUED | OUTPATIENT
Start: 2023-12-01 | End: 2023-12-04 | Stop reason: HOSPADM

## 2023-12-01 RX ORDER — SIMETHICONE 80 MG
1 TABLET,CHEWABLE ORAL EVERY 6 HOURS PRN
Status: DISCONTINUED | OUTPATIENT
Start: 2023-12-01 | End: 2023-12-04 | Stop reason: HOSPADM

## 2023-12-01 RX ORDER — AZITHROMYCIN 100 MG/ML
INJECTION, POWDER, LYOPHILIZED, FOR SOLUTION INTRAVENOUS
Status: DISPENSED
Start: 2023-12-01 | End: 2023-12-01

## 2023-12-01 RX ADMIN — AMOXICILLIN 500 MG: 250 CAPSULE ORAL at 06:12

## 2023-12-01 RX ADMIN — OXYTOCIN 10 UNITS: 10 INJECTION, SOLUTION INTRAMUSCULAR; INTRAVENOUS at 07:12

## 2023-12-01 RX ADMIN — DEXAMETHASONE SODIUM PHOSPHATE 4 MG: 4 INJECTION, SOLUTION INTRAMUSCULAR; INTRAVENOUS at 07:12

## 2023-12-01 RX ADMIN — KETOROLAC TROMETHAMINE 30 MG: 30 INJECTION, SOLUTION INTRAMUSCULAR; INTRAVENOUS at 07:12

## 2023-12-01 RX ADMIN — FENTANYL CITRATE 10 MCG: 0.05 INJECTION, SOLUTION INTRAMUSCULAR; INTRAVENOUS at 06:12

## 2023-12-01 RX ADMIN — OXYTOCIN 5 UNITS: 10 INJECTION, SOLUTION INTRAMUSCULAR; INTRAVENOUS at 07:12

## 2023-12-01 RX ADMIN — BUPIVACAINE HYDROCHLORIDE IN DEXTROSE 1.6 ML: 7.5 INJECTION, SOLUTION SUBARACHNOID at 06:12

## 2023-12-01 RX ADMIN — ONDANSETRON 4 MG: 2 INJECTION INTRAMUSCULAR; INTRAVENOUS at 06:12

## 2023-12-01 RX ADMIN — SODIUM CHLORIDE, SODIUM LACTATE, POTASSIUM CHLORIDE, AND CALCIUM CHLORIDE: 600; 310; 30; 20 INJECTION, SOLUTION INTRAVENOUS at 06:12

## 2023-12-01 RX ADMIN — NALBUPHINE HYDROCHLORIDE 5 MG: 10 INJECTION, SOLUTION INTRAMUSCULAR; INTRAVENOUS; SUBCUTANEOUS at 02:12

## 2023-12-01 RX ADMIN — FAMOTIDINE 20 MG: 10 INJECTION, SOLUTION INTRAVENOUS at 06:12

## 2023-12-01 RX ADMIN — Medication 6 MG: at 10:12

## 2023-12-01 RX ADMIN — ACETAMINOPHEN 1000 MG: 500 TABLET ORAL at 06:12

## 2023-12-01 RX ADMIN — CEFAZOLIN 2 EACH: 330 INJECTION, POWDER, FOR SOLUTION INTRAMUSCULAR; INTRAVENOUS at 06:12

## 2023-12-01 RX ADMIN — MAGNESIUM SULFATE HEPTAHYDRATE 6 G: 40 INJECTION, SOLUTION INTRAVENOUS at 06:12

## 2023-12-01 RX ADMIN — MIDAZOLAM HYDROCHLORIDE 2 MG: 1 INJECTION, SOLUTION INTRAMUSCULAR; INTRAVENOUS at 06:12

## 2023-12-01 RX ADMIN — MAGNESIUM SULFATE HEPTAHYDRATE 2 G/HR: 40 INJECTION, SOLUTION INTRAVENOUS at 06:12

## 2023-12-01 RX ADMIN — Medication 0.1 MG: at 06:12

## 2023-12-01 RX ADMIN — KETOROLAC TROMETHAMINE 30 MG: 30 INJECTION, SOLUTION INTRAMUSCULAR; INTRAVENOUS at 08:12

## 2023-12-01 RX ADMIN — PHENYLEPHRINE HYDROCHLORIDE 0.5 MCG/KG/MIN: 10 INJECTION INTRAVENOUS at 06:12

## 2023-12-01 RX ADMIN — ACETAMINOPHEN 650 MG: 325 TABLET, FILM COATED ORAL at 08:12

## 2023-12-01 RX ADMIN — ACETAMINOPHEN 650 MG: 325 TABLET, FILM COATED ORAL at 01:12

## 2023-12-01 RX ADMIN — SODIUM CITRATE AND CITRIC ACID MONOHYDRATE 30 ML: 334; 500 SOLUTION ORAL at 06:12

## 2023-12-01 RX ADMIN — DOCUSATE SODIUM 200 MG: 100 CAPSULE, LIQUID FILLED ORAL at 08:12

## 2023-12-01 RX ADMIN — DEXMEDETOMIDINE 12 MCG: 200 INJECTION, SOLUTION INTRAVENOUS at 06:12

## 2023-12-01 RX ADMIN — Medication 95 MILLI-UNITS/MIN: at 08:12

## 2023-12-01 RX ADMIN — METHYLERGONOVINE MALEATE 200 MCG: 0.2 INJECTION, SOLUTION INTRAMUSCULAR; INTRAVENOUS at 07:12

## 2023-12-01 RX ADMIN — SODIUM CITRATE AND CITRIC ACID MONOHYDRATE 30 ML: 500; 334 SOLUTION ORAL at 06:12

## 2023-12-01 RX ADMIN — KETOROLAC TROMETHAMINE 30 MG: 30 INJECTION, SOLUTION INTRAMUSCULAR; INTRAVENOUS at 01:12

## 2023-12-01 NOTE — NURSING
RN at bedside to re-discuss fetal monitoring. Patient refuses at this time due to being comfortable and wanting to sleep longer. Pt states vaginal bleeding is no longer present. MD notified.

## 2023-12-01 NOTE — PLAN OF CARE
23 1519   Discharge Reassessment   Assessment Type Discharge Planning Reassessment   Did the patient's condition or plan change since previous assessment? Yes  (pt has delivered  who was admitted to the NICU)   Communicated SOO with patient/caregiver Other (see comments)  (tentative d/c on POD #3 or #4)   Discharge Plan A Home with family   DME Needed Upon Discharge  none   Transition of Care Barriers None   Why the patient remains in the hospital Requires continued medical care   Post-Acute Status   Discharge Delays None known at this time

## 2023-12-01 NOTE — PROGRESS NOTES
"Maternal Fetal Medicine  Progress Note          Subjective:    Nery Brown is a 38 y.o.  at unknown gestational age (EGA 28w6d) admitted for PPROM. Please see H&P for details regarding presentation at admission. This pregnancy is complicated by history of substance abuse (Utox positive for methamphetamines, last used two days ago, denies other drug or alcohol use), poor social situation (patient currently experiencing homelessness, living with a friend and does not have custody of her other children), history of PTD x2, no prenatal care, gHTN, anemia, and Rh negative status.    Interim HPI: Episode of vaginal bleeding overnight, now only having pink tinged fluid. Placed on monitor overnight and noted to have recurrent decelerations this AM. Denies contractions. Reports fetal movement.     Medical, Surgical, Social, Family, and Obstetric History: reviewed in chart  Current Medications:    amoxicillin  500 mg Oral Q8H    famotidine (PF)        ferrous sulfate  1 tablet Oral Daily    magnesium sulfate in water  6 g Intravenous Once    nicotine  1 patch Transdermal Daily    prenatal vitamin  1 tablet Oral Daily    0.9%  NaCl infusion (for blood administration), acetaminophen, azithromycin, calcium gluconate, calcium gluconate, ceFAZolin (ANCEF) IVPB, cyclobenzaprine, diphenhydrAMINE, diphenhydrAMINE, famotidine (PF), lactated ringers, ondansetron, prochlorperazine, senna-docusate 8.6-50 mg, simethicone, sodium chloride 0.9%   Objective:   /65   Pulse 77   Temp 98.7 °F (37.1 °C) (Oral)   Resp 17   Ht 5' 6" (1.676 m)   Wt 71.6 kg (157 lb 13.6 oz)   LMP  (LMP Unknown)   SpO2 97%   Breastfeeding No   BMI 25.48 kg/m²     Focused Physical Examination   General: well developed, no acute distress  Pulmonary: respiratory effort normal with no retractions  Abdomen: gravid, no fundal tenderness  Cervix: deferred this AM    Fetal Monitoring  EFM: 130 baseline/ moderate variability/+ accels/+ recurrent " decels  Tocometer: no contractions noted     Lab Results  CBC   Recent Labs   Lab 23  2204 23  0620   WBC 16.19* 15.70*   HGB 9.0* 8.1*   HCT 29.4* 26.6*   MCV 85 86    324       CMP   Recent Labs   Lab 23  0212 23  0452   * 135*   K 4.0 3.1*    110   CO2 19* 19*   BUN 8 12   CREATININE 0.6 0.6    117*   PROT 6.7 5.4*   BILITOT 0.1 0.1   ALKPHOS 106 93   ALT 16 32   AST 24 37   MG  --  1.7        ROM +: positive  Utox: amphetamines    GC/CT, affirm pending      Assessment:   38 y.o.  at 28w3d admitted for PPROM   Plan:   PPROM  - Remains afebrile, VSS   - Patient had an episode of vaginal bleeding overnight, please see separate care updates by Dr. Stewart. She declined monitoring until 445. Since this time she has had recurrent decelerations that have been increasing in frequency, currently every 3-5 min with FHT to the 60s. Proceeding with urgent  delivery recommended (see separate care update).  - MgSO4, PCN, azithro  - To OR        Sandra Langford MD  OBGYN, PGY-3

## 2023-12-01 NOTE — TRANSFER OF CARE
"Anesthesia Transfer of Care Note    Patient: Nery Brown    Procedure(s) Performed: Procedure(s) (LRB):   SECTION (N/A)   SECTION, WITH TUBAL LIGATION (N/A)    Patient location: Labor and Delivery    Anesthesia Type: regional    Transport from OR: Transported from OR on room air with adequate spontaneous ventilation    Post pain: adequate analgesia    Post assessment: no apparent anesthetic complications    Post vital signs: stable    Level of consciousness: awake and alert    Nausea/Vomiting: no nausea/vomiting    Complications: none    Transfer of care protocol was followed      Last vitals: Visit Vitals  /65   Pulse 77   Temp 37.1 °C (98.7 °F) (Oral)   Resp 17   Ht 5' 6" (1.676 m)   Wt 71.6 kg (157 lb 13.6 oz)   LMP  (LMP Unknown)   SpO2 97%   Breastfeeding No   BMI 25.48 kg/m²     "

## 2023-12-01 NOTE — PLAN OF CARE
VSS. Feeding independently. Ambulating without difficulty. Removed dominguez at 1700 and brought patient down to NICU to see baby. Pain well controlled with PO pain meds. Incision intact with no signs of infection. No acute events this shift. No additional needs at this time.

## 2023-12-01 NOTE — ANESTHESIA PROCEDURE NOTES
Spinal    Diagnosis: IUP  Patient location during procedure: OR  Start time: 12/1/2023 6:45 AM  Timeout: 12/1/2023 6:45 AM  End time: 12/1/2023 6:52 AM    Staffing  Authorizing Provider: Teagan Austin MD  Performing Provider: Robin Logan MD    Staffing  Performed by: Robin Logan MD  Authorized by: Teagan Austin MD    Preanesthetic Checklist  Completed: patient identified, IV checked, site marked, risks and benefits discussed, surgical consent, monitors and equipment checked, pre-op evaluation and timeout performed  Spinal Block  Patient position: sitting  Prep: ChloraPrep  Patient monitoring: heart rate, continuous pulse ox and frequent blood pressure checks  Approach: midline  Location: L4-5  Injection technique: single shot  CSF Fluid: clear free-flowing CSF  Needle  Needle type: Sonia   Needle gauge: 25 G  Needle length: 3.5 in  Additional Documentation: incremental injection, negative aspiration for heme and no paresthesia on injection  Needle localization: anatomical landmarks  Assessment  Sensory level: T4   Dermatomal levels determined by alcohol wipe  Ease of block: easy  Patient's tolerance of the procedure: comfortable throughout block

## 2023-12-01 NOTE — NURSING
Pad Counts:    2128: 25% saturated  2317: <10% saturated   0200: pt reports vaginal bleeding is no longer present

## 2023-12-01 NOTE — CARE UPDATE
Resident to bedside with RN to evaluate patient. Patient reports she noticed some red tinged fluid on her peripad. Pad about 25% saturated. She reports this is the same fluid that she noticed when she initially presented to the hospital. Patient denies contractions.    SSE: Pooling of blood tinged fluid. Cervix visibly 1 cm dilated.     Plan:   - Will make patient NPO and begin continuous fetal monitoring  - Patient currently does not have IV access. Discussed with patient that we must maintain IV access in the event that she will need to give her blood products or IV medication in an emergency especially. Counseled patient on maternal and fetal risks of not having IV access as well.  - Patient desires discharge tomorrow AMA and reports she still does not want her IV.   - Patient initially refused fetal monitoring but then was amendable after further discussion.   - Strict pad counts.   - Will continue to monitor.     Juju Stewart MD  OBGYN PGY-2

## 2023-12-01 NOTE — L&D DELIVERY NOTE
[facility] - Labor & Delivery   Section   Operative Note    SUMMARY     Procedure:   1. Primary  Section via Pfannenstiel skin incision  2. Bilateral tubal ligation    Indications:   1.  PPROM  2. Nonreassuring fetal status     Pre-operative Diagnosis:   1. IUP at 28 week 3 day pregnancy  2. PPROM  3. Nonreassuring fetal status   4. gHTN  5. Anemia  6. History of substance abuse   7. Poor social situation  8. Unwanted fertility     Post-operative Diagnosis:   1-8. Same  9. S/p primary  section with bilateral tubal ligation  10. Placental abruption  11. PPH    Surgeon: Gulshan Ybarra MD      Assistants: aSndra Langford MD - PGY-3    Anesthesia: Spinal anesthesia    Findings:    1. Single viable  male infant, with Apgar scores 3/6/8, weight 1360g.   2. Normal appearing uterus, ovaries, and fallopian tubes.  3. Evidence of placental abruption with significant clot behind placenta. Calcifications throughout placenta. Placenta sent to pathology.  4. Uterine atony requiring extra 10u pitocin and methergine   5. BTL performed without complication   6. Excellent hemostasis following closure of each tissue layer     Estimated Blood Loss:  1025 mL           Total IV Fluids: See anesthesia records      UOP: 100 mL    Specimens:   1. Single viable male infant  2. Placenta, which was sent to pathology  3. Right fallopian tube segment   4. Left fallopian tube segment     PreOp CBC:   Lab Results   Component Value Date    WBC 21.31 (H) 2023    HGB 8.7 (L) 2023    HCT 28.1 (L) 2023    MCV 86 2023     2023                     Complications:  None; patient tolerated the procedure well.           Disposition: PACU - hemodynamically stable.           Condition: stable    Procedure Details   The patient was seen in her labor room where the diagnosis of non-reassuring fetal status with concern for placenta abruption was discussed and urgent  delivery recommended. The  risks, benefits, complications, treatment options, and expected outcomes were discussed with the patient. The patient concurred with the proposed plan, giving informed consent. The patient was taken to Operating Room, identified as Nery Brown and the procedure verified as primary  Delivery with bilateral tubal ligation. A Time Out was held and the above information confirmed.    After induction of anesthesia, the patient was prepped and draped in the usual sterile manner while placed in a dorsal supine position with a left lateral tilt. A dominguez catheter was also placed per nursing. SCDs were on and cycling. Preoperative antibiotics Ancef 2 g and azithromycin 500 mg were administered. An allis test was performed confirming adequate anesthesia. A Pfannenstiel incision was made and carried down through the subcutaneous tissue to the fascia. Fascial incision was made and extended transversely. The fascia was grasped with kocher clamps and  from the underlying rectus tissue superiorly and inferiorly. The peritoneum was identified, found to be free of adherent bowel, and entered bluntly. Peritoneal incision was extended longitudinally. The vesico-uterine peritoneum was identified, and bladder blade was inserted to keep the bladder out of the operative field. A low transverse uterine incision was made with knife and extended with cephalocaudad traction. The amniotic sac was ruptured with the hysterotomy and the infant was noted to be in cephalic presentation. The fetal head was brought to the incision and elevated out of the pelvis. The patient delivered a single viable male infant without difficulty. Infant weighed 1360 grams with Apgar scores of 3/6/8 at 1, 5, 10 minutes respectively. After the umbilical cord was clamped and cut, cord blood and gases were obtained for evaluation. The placenta was removed intact with significant clot following, concerning for abruption. There were also multiple  calcifications throughout the placenta. Placenta sent to pathology. The uterus was exteriorized. Uterine atony with bleeding was noted and extra pitocin as well as methergine were administered with improvement in uterine tone. The uterine incision was closed with running locked sutures of 1 chromic. Bleeding was noted along the middle aspect of the hysterotomy and a figure of 8 of 1 chromic was placed. Hemostasis was observed. The uterine outline, tubes and ovaries appeared normal. Attention was then turned towards tubal ligation. The left fallopian tube was grasped with a Genaro and bovie cautery used to create a window in an avascular space of the mesosalpinx. Tubal ligation was performed via the Meta method with 0 plain gut. Next the right fallopian tube was grasped with a Mendon and bovie cautery used to make a window in an avascular plane of the mesosalpinx. Tubal ligation was performed via modified Meta and Taisha method with 0 plain gut. Hemostasis was observed at the tubal stumps. Both tubal segments sent to pathology. The abdominal cavity was swept with moist laps to remove clots. The uterus was returned to the abdominal cavity. Incision was reinspected and good hemostasis was noted. The abdominal cavity was again swept with moist laps to remove clots. The subfascial space was inspected and excellent hemostasis achieved. The fascia was then reapproximated with running sutures of 1 PDS. The subcutaneous fat was reapproximated with 2-0 plain gut, and skin was reapproximated with 4-0 monocryl.    Instrument, sponge, and needle counts were correct prior the abdominal closure and at the conclusion of the case.     Pt tolerated procedure well and was in stable condition after the procedure.      **NOTE: This patient is a candidate for trial of labor after  delivery**      Sandra Langford MD  OBGYN, PGY-2           Specimens:   Specimen (24h ago, onward)       Start     Ordered    23 0717   Specimen to Pathology, Surgery Gynecology and Obstetrics  Once        Comments: Pre-op Diagnosis: Pregnancy [Z34.90]Procedure(s): SECTION Number of specimens: 2Name of specimens: right and left fallopian tubes     References:    Click here for ordering Quick Tip   Question Answer Comment   Procedure Type: Gynecology and Obstetrics    Specimen Class: Complex case/Special    Which provider would you like to cc? SANDRA LANGFORD    Release to patient Immediate        23 0723 07  Specimen to Pathology, Surgery Gynecology and Obstetrics  Once        Comments: Pre-op Diagnosis: Pregnancy [Z34.90]Procedure(s): SECTION Number of specimens: 1Name of specimens: placenta     References:    Click here for ordering Quick Tip   Question Answer Comment   Procedure Type: Gynecology and Obstetrics    Specimen Class: Complex case/Special    Which provider would you like to cc? SANDRA LANGFORD    Release to patient Immediate        23                    VTE Risk Mitigation (From admission, onward)           Ordered     Reason for No Pharmacological VTE Prophylaxis  Once        Question:  Reasons:  Answer:  Neuraxial Anesthesia    23 0734     Place sequential compression device  Until discontinued         23 0627     IP VTE LOW RISK PATIENT  Once         23 0412     Place sequential compression device  Until discontinued         23 0204                       Delivery Information for Adryan Brown    Birth information:  YOB: 2023   Time of birth: 7:06 AM   Sex: male   Head Delivery Date/Time: 2023  7:06 AM   Delivery type: , Low Transverse   Gestational Age: 28w3d        Delivery Providers    Delivering clinician: Gulshan Ybarra MD   Provider Role    Sandra Langford MD Resident    Flori Shepard RN Circulator    Gale Garsia,  Surgical Tech    Teagan Austin MD Anesthesiologist    Robin Logan MD Resident               Measurements    Weight: 1360 g  Weight (lbs): 3 lb  Length:          Apgars    Living status: Living  Apgar Component Scores:  1 min.:  5 min.:  10 min.:  15 min.:  20 min.:    Skin color:  0  1  1      Heart rate:  1  2  2      Reflex irritability:  0  1  2      Muscle tone:  1  1  1      Respiratory effort:  1  1  2      Total:  3  6  8      Apgars assigned by: NICU         Operative Delivery    Forceps attempted?: No  Vacuum extractor attempted?: No         Shoulder Dystocia    Shoulder dystocia present?: No           Presentation    Presentation: Vertex  Position: Left Occiput Anterior           Interventions/Resuscitation    Method: NICU Attended       Cord    Vessels: 3 vessels  Complications: None  Delayed Cord Clamping?: No  Cord Clamped Date/Time: 2023  7:06 AM  Cord Blood Disposition: Sent with Baby  Gases Sent?: Yes  Stem Cell Collection (by MD): No       Placenta    Placenta delivery date/time: 2023 0708  Placenta removal: Manual removal  Placenta appearance: Intact  Placenta disposition: Pathology           Labor Events:       labor:       Labor Onset Date/Time:         Dilation Complete Date/Time:         Start Pushing Date/Time:         Start Pushing Date/Time:       Rupture Date/Time:            Rupture type:          Fluid Amount:       Fluid Color:                steroids:       Antibiotics given for GBS:       Induction:       Indications for induction:        Augmentation:       Indications for augmentation:       Labor complications: Abruptio Placenta     Additional complications:          Cervical ripening:                     Delivery:      Episiotomy:       Indication for Episiotomy:       Perineal Lacerations:   Repaired:      Periurethral Laceration:   Repaired:     Labial Laceration:   Repaired:     Sulcus Laceration:   Repaired:     Vaginal Laceration:   Repaired:     Cervical Laceration:   Repaired:     Repair suture:       Repair # of packets:        Last Value - EBL - Nursing (mL):       Sum - EBL - Nursing (mL): 0     Last Value - EBL - Anesthesia (mL):      Calculated QBL (mL): 1025      Vaginal Sweep Performed:       Surgicount Correct:       Vaginal Packing:   Quantity:       Other providers:       Anesthesia    Method: Spinal, Epidural          Details (if applicable):  Trial of Labor No    Categorization: Primary    Priority: Emergency   Indications for : Abruption;Fetal heart rate abnormalities   Incision Type: low transverse     Additional  information:  Forceps:    Vacuum:    Breech:    Observed anomalies    Other (Comments):

## 2023-12-01 NOTE — CARE UPDATE
To bedside for discussion of plan of care with patient. There have been recurrent decelerations with FHT to the 60s Q3-5 min. Given nonreassuring fetal monitoring and bleeding overnight there is high concern for placental abruption. This was discussed with MFM on call who agrees expectant management is not longer indicated. I discussed with the patient recommendations for proceeding with primary  delivery, she voiced understanding and agreement.    Anesthesia, charge RN, NICU notified. Will need to again obtain IV access. Start MgSO4 for fetal neuroprotection and PCN.     To OR urgently for pLTCS w/ BTL.    .kdm

## 2023-12-01 NOTE — CARE UPDATE
Resident to bedside to rediscuss restarting fetal monitoring with patient. Patient has refused monitoring throughout the night. Patient reports she is now agreeable to starting monitoring again. Reports she is not longer having vaginal bleeding. Denies contractions. Will continue to monitor.    Juju Stewart MD  OBGYN PGY-2

## 2023-12-01 NOTE — CARE UPDATE
A1c.  Consider metformin if elevated.  Discussed weight reduction and dietary change   Resident to bedside with RN to discuss fetal monitoring. Patient removed fetal monitoring and tocometer. Reports the bands make her itch and she is unable to get comfortable enough to sleep. Discussed with patient that we recommend continuous fetal monitoring given that previous monitoring showed Category 2 tracing with late decels. Patient states she would like to rest and does not want to be monitored. Reports she needs sleep before being monitored and agreed to be monitored after sleeping for at least 2 hours at least. Discussed risk of not being able to monitor fetal status. Patient reports she feels her baby move and knows he is ok. Patient continues to decline despite adequate counseling on risks. Will re-discuss monitoring.    Juju Stewart MD  OBGYN PGY-2

## 2023-12-01 NOTE — NURSING
"RN at bedside for Amoxicillin administration at 2128. Pt complains of vaginal bleeding on pad. MD notified.     MD at bedside to discuss vaginal bleeding concerns and discuss plan of care. Speculum exam performed, fetal monitoring initiated and plan to initiate IV access discussed. Fresh pad given to pt and instructions to notify RN if bleeding becomes worse. Pt verbalizes understanding.     RN to pt bedside at 2205 to initiate IV access. Pt refuses IV at this time wanting to go to sleep. Informed Dr. Terry MD will talk to pt.     Pt called out at 2212 to use restroom. RN at bedside following restroom, pt refusing monitors to be put back on for fetal monitoring and would like to go to sleep.     Dr. Stewart to bedside at 2223 to discuss plan of care. Pt verbalizes understanding. Refuses IV at this time but agrees to fetal monitoring.     At 2254, RN at pt bedside to discuss MD orders regarding continuous monitoring, NPO diet, and strict pad counts. Pt verbalizes understanding.     Pt called out at 2317 to use restroom. RN at bedside to help patient unplug from monitors. Pt expresses frustration, request to take monitors off and refuses for them to be put back on. Pt is becoming agitated and states "Baby is moving around and is fine. I would like to go to sleep. I am going home tomorrow." RN re-iterates the reasoning behind fetal monitoring interventions, expresses understanding of pt's wishes, and will notify MD. Pt expressed request for MD to not come to bedside. MD notified of pt requests.     At 0000, Dr. Stewart and Dr. Boecking at bedside to discuss fetal monitoring interventions. Pt agrees to fetal monitoring after sleeping for a few hours.     Pt safety maintained.   "

## 2023-12-02 LAB
BASOPHILS # BLD AUTO: 0.07 K/UL (ref 0–0.2)
BASOPHILS NFR BLD: 0.4 % (ref 0–1.9)
BLD PROD TYP BPU: NORMAL
BLOOD UNIT EXPIRATION DATE: NORMAL
BLOOD UNIT TYPE CODE: 600
BLOOD UNIT TYPE: NORMAL
CODING SYSTEM: NORMAL
CROSSMATCH INTERPRETATION: NORMAL
DIFFERENTIAL METHOD: ABNORMAL
DISPENSE STATUS: NORMAL
EOSINOPHIL # BLD AUTO: 0.1 K/UL (ref 0–0.5)
EOSINOPHIL NFR BLD: 0.8 % (ref 0–8)
ERYTHROCYTE [DISTWIDTH] IN BLOOD BY AUTOMATED COUNT: 16.5 % (ref 11.5–14.5)
HCT VFR BLD AUTO: 22.5 % (ref 37–48.5)
HGB BLD-MCNC: 7 G/DL (ref 12–16)
IMM GRANULOCYTES # BLD AUTO: 0.75 K/UL (ref 0–0.04)
IMM GRANULOCYTES NFR BLD AUTO: 4.5 % (ref 0–0.5)
LYMPHOCYTES # BLD AUTO: 2.4 K/UL (ref 1–4.8)
LYMPHOCYTES NFR BLD: 14.4 % (ref 18–48)
MCH RBC QN AUTO: 26.6 PG (ref 27–31)
MCHC RBC AUTO-ENTMCNC: 31.1 G/DL (ref 32–36)
MCV RBC AUTO: 86 FL (ref 82–98)
MONOCYTES # BLD AUTO: 1.3 K/UL (ref 0.3–1)
MONOCYTES NFR BLD: 7.7 % (ref 4–15)
NEUTROPHILS # BLD AUTO: 12.1 K/UL (ref 1.8–7.7)
NEUTROPHILS NFR BLD: 72.2 % (ref 38–73)
NRBC BLD-RTO: 1 /100 WBC
NUM UNITS TRANS PACKED RBC: NORMAL
PLATELET # BLD AUTO: 296 K/UL (ref 150–450)
PMV BLD AUTO: 10.5 FL (ref 9.2–12.9)
RBC # BLD AUTO: 2.63 M/UL (ref 4–5.4)
WBC # BLD AUTO: 16.79 K/UL (ref 3.9–12.7)

## 2023-12-02 PROCEDURE — 11000001 HC ACUTE MED/SURG PRIVATE ROOM

## 2023-12-02 PROCEDURE — 25000003 PHARM REV CODE 250: Performed by: STUDENT IN AN ORGANIZED HEALTH CARE EDUCATION/TRAINING PROGRAM

## 2023-12-02 PROCEDURE — 36415 COLL VENOUS BLD VENIPUNCTURE: CPT | Performed by: STUDENT IN AN ORGANIZED HEALTH CARE EDUCATION/TRAINING PROGRAM

## 2023-12-02 PROCEDURE — 25000003 PHARM REV CODE 250

## 2023-12-02 PROCEDURE — 85025 COMPLETE CBC W/AUTO DIFF WBC: CPT | Performed by: STUDENT IN AN ORGANIZED HEALTH CARE EDUCATION/TRAINING PROGRAM

## 2023-12-02 PROCEDURE — 99232 PR SUBSEQUENT HOSPITAL CARE,LEVL II: ICD-10-PCS | Mod: ,,, | Performed by: OBSTETRICS & GYNECOLOGY

## 2023-12-02 PROCEDURE — 86920 COMPATIBILITY TEST SPIN: CPT

## 2023-12-02 PROCEDURE — 99232 SBSQ HOSP IP/OBS MODERATE 35: CPT | Mod: ,,, | Performed by: OBSTETRICS & GYNECOLOGY

## 2023-12-02 RX ORDER — HYDROCODONE BITARTRATE AND ACETAMINOPHEN 500; 5 MG/1; MG/1
TABLET ORAL
Status: DISCONTINUED | OUTPATIENT
Start: 2023-12-02 | End: 2023-12-02

## 2023-12-02 RX ORDER — OXYCODONE HYDROCHLORIDE 10 MG/1
10 TABLET ORAL EVERY 4 HOURS PRN
Status: DISPENSED | OUTPATIENT
Start: 2023-12-02 | End: 2023-12-03

## 2023-12-02 RX ORDER — OXYCODONE HYDROCHLORIDE 5 MG/1
5 TABLET ORAL EVERY 4 HOURS PRN
Status: ACTIVE | OUTPATIENT
Start: 2023-12-02 | End: 2023-12-03

## 2023-12-02 RX ADMIN — OXYCODONE HYDROCHLORIDE 5 MG: 5 TABLET ORAL at 05:12

## 2023-12-02 RX ADMIN — ACETAMINOPHEN 650 MG: 325 TABLET, FILM COATED ORAL at 03:12

## 2023-12-02 RX ADMIN — IBUPROFEN 800 MG: 400 TABLET ORAL at 03:12

## 2023-12-02 RX ADMIN — FERROUS SULFATE TAB 325 MG (65 MG ELEMENTAL FE) 1 EACH: 325 (65 FE) TAB at 08:12

## 2023-12-02 RX ADMIN — PRENATAL VIT W/ FE FUMARATE-FA TAB 27-0.8 MG 1 TABLET: 27-0.8 TAB at 08:12

## 2023-12-02 RX ADMIN — ACETAMINOPHEN 650 MG: 325 TABLET, FILM COATED ORAL at 02:12

## 2023-12-02 RX ADMIN — OXYCODONE HYDROCHLORIDE 10 MG: 10 TABLET ORAL at 08:12

## 2023-12-02 RX ADMIN — IBUPROFEN 800 MG: 400 TABLET ORAL at 08:12

## 2023-12-02 RX ADMIN — ACETAMINOPHEN 650 MG: 325 TABLET, FILM COATED ORAL at 08:12

## 2023-12-02 RX ADMIN — DOCUSATE SODIUM 200 MG: 100 CAPSULE, LIQUID FILLED ORAL at 08:12

## 2023-12-02 RX ADMIN — Medication 6 MG: at 08:12

## 2023-12-02 RX ADMIN — OXYCODONE HYDROCHLORIDE 10 MG: 10 TABLET ORAL at 12:12

## 2023-12-02 RX ADMIN — IBUPROFEN 800 MG: 400 TABLET ORAL at 02:12

## 2023-12-02 NOTE — CARE UPDATE
Resident to bedside after morning CBC returned H/h of 7.0/22.5.  Pt reports significant fatigue and lightheadedness.  R/B/A to blood transfusion discussed; shared decision made to transfuse.  Will continue to monitor.  Repeat cbc this evening.    Oscra Mckenzie MD MS  OB/Gyn  PGY-1

## 2023-12-02 NOTE — NURSING
Pt walking with wheelchair. Asked how to get to 2nd floor. Pt told to only walk on MBU or walk to and from NICU to visit baby. Pt repeatedly said  Ok'd her to walk. Pt told again to only walk on this unit or to NICU.

## 2023-12-02 NOTE — ANESTHESIA POSTPROCEDURE EVALUATION
Anesthesia Post Evaluation    Patient: Nery Brown    Procedure(s) Performed: Procedure(s) (LRB):   SECTION (N/A)   SECTION, WITH TUBAL LIGATION (N/A)    Final Anesthesia Type: spinal      Patient location during evaluation: floor  Patient participation: Yes- Able to Participate  Level of consciousness: awake and alert  Post-procedure vital signs: reviewed and stable  Pain management: adequate  Airway patency: patent  KEN mitigation strategies: Use of major conduction anesthesia (spinal/epidural) or peripheral nerve block  PONV status at discharge: No PONV  Anesthetic complications: no      Cardiovascular status: blood pressure returned to baseline and hemodynamically stable  Respiratory status: unassisted, spontaneous ventilation and room air  Hydration status: euvolemic  Follow-up not needed.              Vitals Value Taken Time   /61 23 0846   Temp 36.5 °C (97.7 °F) 23 0846   Pulse 79 2346   Resp 18 2346   SpO2 96 % 23         No case tracking events are documented in the log.      Pain/Nadege Score: Pain Rating Prior to Med Admin: 5 (2023  8:48 AM)  Pain Rating Post Med Admin: 3 (2023  9:35 AM)

## 2023-12-02 NOTE — PROGRESS NOTES
POSTPARTUM PROGRESS NOTE    Subjective:     PPD/POD#: 2   Procedure: Primary LTCS   EGA: 28w3d   N/V: No   F/C: No   Abd Pain: Mild, well-controlled with oral pain medication   Lochia: Mild   Voiding: Yes   Ambulating: Yes   Bowel fnc: Yes   Contraception: BTL with C/S     Objective:      Temp:  [97.5 °F (36.4 °C)-98.5 °F (36.9 °C)] 98.5 °F (36.9 °C)  Pulse:  [58-80] 76  Resp:  [16-18] 18  SpO2:  [96 %-100 %] 96 %  BP: (106-143)/(56-83) 106/56    Abdomen: Soft, appropriately tender   Uterus: Firm, no fundal tenderness   Incision: Bandage in place without shadowing     Lab Review    Recent Labs   Lab 11/28/23  0212 11/30/23  0452   * 135*   K 4.0 3.1*    110   CO2 19* 19*   BUN 8 12   CREATININE 0.6 0.6    117*   PROT 6.7 5.4*   BILITOT 0.1 0.1   ALKPHOS 106 93   ALT 16 32   AST 24 37   MG  --  1.7       Recent Labs   Lab 12/01/23  0620 12/01/23  0727 12/01/23  0727 12/01/23  1237 12/02/23  0453   WBC 15.70*  --   --  21.31* 16.79*   HGB 8.1*  --   --  8.7* 7.0*   HCT 26.6*   < > 49 28.1* 22.5*   MCV 86  --   --  86 86     --   --  307 296    < > = values in this interval not displayed.         I/O    Intake/Output Summary (Last 24 hours) at 12/2/2023 0649  Last data filed at 12/1/2023 2000  Gross per 24 hour   Intake --   Output 2350 ml   Net -2350 ml        Assessment and Plan:   Postpartum care  - Patient doing well.  - Continue routine management and advances.    Anemia/ ABLA 2/2 PPH  - H/H as above  - QBL: 1025  - asymptomatic  - iron/colace    gHTN  - BP as above  - asymptomatic  - preE labs as above  - adequate  - Mag: not indicated  - Hypertensive agent not indicated    Poor social situation  - Patient currently experiencing homelessness and staying with a friend  - Patient currently does not have custody of her 7 other children  - Reports she wishes to parent this child  - Social work consult to be placed    Rh negative  - Patient has not received rho quan this pregnancy  - S/p Rho  quan on admit, Rho quan work up pp     History of drug abuse  - Utox at OSH +Methamphetamines  - Patient last used two days ago, denies symptoms of withdrawal   - Denies ever experiencing withdrawal  - Social work consult     Zara Hartman MD  Obstetrics and Gynecology, PGY-1  ______________________________________  Fellow Attestation    I have reviewed and concur with the resident's history, physical, assessment, and plan. I have personally interviewed and examined the patient at bedside.   See addendum bellow for my evaluation and additional findings:    Now POD1 with:  Anemia: pt is symptomatic. 1u pRBC pending transfusion.   Remainder of plan as outlined above    NEHA Medellin MD  Maternal Fetal Medicine Fellow   PGY-5

## 2023-12-02 NOTE — NURSING
"Attempted to insert IV to prepare patient for blood transfusion. Vein blew and patient claimed she felt "queasy" and had second thoughts about blood transfusion. Requested to speak with MD. Patient thinks taking a nap will feel better, as she has been "so tired" since delivering baby. MD alerted. Patient intends to take a nap and call whenever she's ready to get IV inserted and start transfusion. No further needs at this time.   "

## 2023-12-02 NOTE — NURSING
"Patient returned from NICU and reported feeling weak and fatigued. Discussed possibility of blood transfusion. Patient was open to the idea and agreed. Alerted MD. Before an IV could be inserted, patient decided she would rather get transfusion in the morning because she's "too tired right now." Alerted MD Mckenzie, who said he would go speak to patient. No further updates at this time.    "

## 2023-12-02 NOTE — PLAN OF CARE
Patient safety maintained, side rails up, bed low and locked position. Pt ambulating and voiding independently.  Pain well controlled with PRN pain medication. Fundus midline, firm, with moderate  lochia. Patient visiting baby in NICU. Dressing clean, dry, and intact. After taking her nap, patient stated she didn't want the blood transfusion at all. Educated patient on signs and symptoms of anemia to watch out for, and told her to alert nurse if any of those pop up. Patient verified understanding. No further needs at this time.

## 2023-12-02 NOTE — NURSING
"Went in to discuss giving Rhogam + doing maris studies with patient. Patient claims she doesn't want Rhogam because she "can't have kids anyway." Educated importance. Patient refused Rhogam. Updated MD Mckenzie.   "

## 2023-12-03 LAB
ANISOCYTOSIS BLD QL SMEAR: SLIGHT
BASOPHILS NFR BLD: 0 % (ref 0–1.9)
DIFFERENTIAL METHOD: ABNORMAL
EOSINOPHIL NFR BLD: 1 % (ref 0–8)
ERYTHROCYTE [DISTWIDTH] IN BLOOD BY AUTOMATED COUNT: 18.6 % (ref 11.5–14.5)
HCT VFR BLD AUTO: 26.7 % (ref 37–48.5)
HGB BLD-MCNC: 8.3 G/DL (ref 12–16)
IMM GRANULOCYTES # BLD AUTO: ABNORMAL K/UL (ref 0–0.04)
IMM GRANULOCYTES NFR BLD AUTO: ABNORMAL % (ref 0–0.5)
LYMPHOCYTES NFR BLD: 12 % (ref 18–48)
MCH RBC QN AUTO: 27 PG (ref 27–31)
MCHC RBC AUTO-ENTMCNC: 31.1 G/DL (ref 32–36)
MCV RBC AUTO: 87 FL (ref 82–98)
METAMYELOCYTES NFR BLD MANUAL: 2 %
MONOCYTES NFR BLD: 4 % (ref 4–15)
MYELOCYTES NFR BLD MANUAL: 3 %
NEUTROPHILS NFR BLD: 75 % (ref 38–73)
NEUTS BAND NFR BLD MANUAL: 3 %
NRBC BLD-RTO: 0 /100 WBC
PLATELET # BLD AUTO: 360 K/UL (ref 150–450)
PLATELET BLD QL SMEAR: ABNORMAL
PMV BLD AUTO: 9.6 FL (ref 9.2–12.9)
POLYCHROMASIA BLD QL SMEAR: ABNORMAL
RBC # BLD AUTO: 3.07 M/UL (ref 4–5.4)
WBC # BLD AUTO: 20.6 K/UL (ref 3.9–12.7)

## 2023-12-03 PROCEDURE — 99232 SBSQ HOSP IP/OBS MODERATE 35: CPT | Mod: ,,, | Performed by: OBSTETRICS & GYNECOLOGY

## 2023-12-03 PROCEDURE — 25000003 PHARM REV CODE 250

## 2023-12-03 PROCEDURE — 85027 COMPLETE CBC AUTOMATED: CPT

## 2023-12-03 PROCEDURE — 85007 BL SMEAR W/DIFF WBC COUNT: CPT

## 2023-12-03 PROCEDURE — 99232 PR SUBSEQUENT HOSPITAL CARE,LEVL II: ICD-10-PCS | Mod: ,,, | Performed by: OBSTETRICS & GYNECOLOGY

## 2023-12-03 PROCEDURE — 36415 COLL VENOUS BLD VENIPUNCTURE: CPT

## 2023-12-03 PROCEDURE — 25000003 PHARM REV CODE 250: Performed by: STUDENT IN AN ORGANIZED HEALTH CARE EDUCATION/TRAINING PROGRAM

## 2023-12-03 PROCEDURE — 11000001 HC ACUTE MED/SURG PRIVATE ROOM

## 2023-12-03 RX ORDER — OXYCODONE HYDROCHLORIDE 10 MG/1
10 TABLET ORAL EVERY 4 HOURS PRN
Status: DISCONTINUED | OUTPATIENT
Start: 2023-12-03 | End: 2023-12-04 | Stop reason: HOSPADM

## 2023-12-03 RX ORDER — NIFEDIPINE 30 MG/1
30 TABLET, EXTENDED RELEASE ORAL DAILY
Status: DISCONTINUED | OUTPATIENT
Start: 2023-12-03 | End: 2023-12-04 | Stop reason: HOSPADM

## 2023-12-03 RX ORDER — OXYCODONE HYDROCHLORIDE 5 MG/1
5 TABLET ORAL EVERY 4 HOURS PRN
Status: DISCONTINUED | OUTPATIENT
Start: 2023-12-03 | End: 2023-12-04 | Stop reason: HOSPADM

## 2023-12-03 RX ADMIN — PRENATAL VIT W/ FE FUMARATE-FA TAB 27-0.8 MG 1 TABLET: 27-0.8 TAB at 09:12

## 2023-12-03 RX ADMIN — IBUPROFEN 800 MG: 400 TABLET ORAL at 09:12

## 2023-12-03 RX ADMIN — OXYCODONE HYDROCHLORIDE 10 MG: 10 TABLET ORAL at 08:12

## 2023-12-03 RX ADMIN — DOCUSATE SODIUM 200 MG: 100 CAPSULE, LIQUID FILLED ORAL at 08:12

## 2023-12-03 RX ADMIN — DOCUSATE SODIUM 200 MG: 100 CAPSULE, LIQUID FILLED ORAL at 09:12

## 2023-12-03 RX ADMIN — ACETAMINOPHEN 650 MG: 325 TABLET, FILM COATED ORAL at 05:12

## 2023-12-03 RX ADMIN — FERROUS SULFATE TAB 325 MG (65 MG ELEMENTAL FE) 1 EACH: 325 (65 FE) TAB at 09:12

## 2023-12-03 RX ADMIN — IBUPROFEN 800 MG: 400 TABLET ORAL at 08:12

## 2023-12-03 RX ADMIN — Medication 6 MG: at 08:12

## 2023-12-03 RX ADMIN — IBUPROFEN 800 MG: 400 TABLET ORAL at 12:12

## 2023-12-03 RX ADMIN — OXYCODONE HYDROCHLORIDE 10 MG: 10 TABLET ORAL at 05:12

## 2023-12-03 RX ADMIN — ACETAMINOPHEN 650 MG: 325 TABLET, FILM COATED ORAL at 12:12

## 2023-12-03 RX ADMIN — OXYCODONE HYDROCHLORIDE 10 MG: 10 TABLET ORAL at 10:12

## 2023-12-03 RX ADMIN — IBUPROFEN 800 MG: 400 TABLET ORAL at 03:12

## 2023-12-03 RX ADMIN — NIFEDIPINE 30 MG: 30 TABLET, FILM COATED, EXTENDED RELEASE ORAL at 09:12

## 2023-12-03 NOTE — PROGRESS NOTES
POSTPARTUM PROGRESS NOTE    Subjective:     PPD/POD#: 2   Procedure: Primary LTCS   EGA: 28w3d   N/V: No   F/C: No   Abd Pain: Mild, well-controlled with oral pain medication   Lochia: Mild   Voiding: Yes   Ambulating: Yes   Bowel fnc: Yes   Contraception: BTL with C/S     Objective:      Temp:  [97.6 °F (36.4 °C)-98.2 °F (36.8 °C)] 97.9 °F (36.6 °C)  Pulse:  [76-98] 79  Resp:  [18] 18  SpO2:  [96 %-99 %] 96 %  BP: (128-151)/(61-78) 151/69    Abdomen: Soft, appropriately tender   Uterus: Firm, no fundal tenderness   Incision: Bandage in place without shadowing     Lab Review    Recent Labs   Lab 11/28/23  0212 11/30/23  0452   * 135*   K 4.0 3.1*    110   CO2 19* 19*   BUN 8 12   CREATININE 0.6 0.6    117*   PROT 6.7 5.4*   BILITOT 0.1 0.1   ALKPHOS 106 93   ALT 16 32   AST 24 37   MG  --  1.7       Recent Labs   Lab 12/01/23  0620 12/01/23  0727 12/01/23  0727 12/01/23  1237 12/02/23  0453   WBC 15.70*  --   --  21.31* 16.79*   HGB 8.1*  --   --  8.7* 7.0*   HCT 26.6*   < > 49 28.1* 22.5*   MCV 86  --   --  86 86     --   --  307 296    < > = values in this interval not displayed.         I/O  No intake or output data in the 24 hours ending 12/03/23 0430     Assessment and Plan:   Postpartum care  - Patient doing well.  - Continue routine management and advances.    Anemia/ ABLA 2/2 PPH  - H/H as above  - QBL: 1025  - Patient reports fatigue, denies dizziness, weakness, or SOB  - iron/colace  - Discussed recommendation for blood transfusion which patient was undecided about yesterday, and patient adamantly declined after counseling    gHTN  - BP as above  - asymptomatic  - preE labs as above  - adequate  - Mag: not indicated  - Hypertensive agent indicated for two >150 SBP, will start procardia 30    Poor social situation  - Patient currently experiencing homelessness and staying with a friend  - Patient currently does not have custody of her 7 other children  - Reports she wishes to  parent this child  - Social work consult to be placed    Rh negative  - Patient has not received rho quan this pregnancy  - S/p Rho quan on admit, Rho quan work up pp  - Similarly to above (ABLA), patient adamantly declined rhogam     History of drug abuse  - Utox at OSH +Methamphetamines  - Patient last used two days ago, denies symptoms of withdrawal   - Denies ever experiencing withdrawal  - Social work consult     Malena Grant MD  OB/GYN PGY-2   ______________________________________  Fellow Attestation    I have reviewed and concur with the resident's history, physical, assessment, and plan. I have personally interviewed and examined the patient at bedside.   See addendum bellow for my evaluation and additional findings:    Now 28w3d admitted for the following:  Hospital Day: 6    Anemia: recheck CBC today. If below 7, transfuse 1u. Pt in agreement.   Remainder of plan as outlined above    NEHA Medellin MD  Maternal Fetal Medicine Fellow   PGY-5

## 2023-12-03 NOTE — NURSING
"RN notified Dr. Naidu that pt is at the bedside requesting for her IV to be removed. RN educated pt that she will be getting a blood transfusion in a few hours. Pt stated "I do not want the transfusion, take this IV out now." RN removed IV per pt request. No further changes at this time.   "

## 2023-12-03 NOTE — PLAN OF CARE
Patient safety maintained, side rails up, bed low and locked position. Pt ambulating and voiding independently.  Pain well controlled with PRN pain medication. Fundus midline, firm, with light lochia.Incision site clean, dry, and intact. Procardia 30mg started today. Will continue to monitor.     Problem:  Fall Injury Risk  Goal: Absence of Fall, Infant Drop and Related Injury  Outcome: Ongoing, Progressing     Problem: Adult Inpatient Plan of Care  Goal: Plan of Care Review  Outcome: Ongoing, Progressing  Goal: Patient-Specific Goal (Individualized)  Outcome: Ongoing, Progressing  Goal: Absence of Hospital-Acquired Illness or Injury  Outcome: Ongoing, Progressing  Goal: Optimal Comfort and Wellbeing  Outcome: Ongoing, Progressing  Goal: Readiness for Transition of Care  Outcome: Ongoing, Progressing     Problem: Infection  Goal: Absence of Infection Signs and Symptoms  Outcome: Ongoing, Progressing     Problem: Prelabor Rupture of Membranes  Goal: Delayed Delivery with Absence of Infection  Outcome: Ongoing, Progressing     Problem: Maternal-Fetal Wellbeing  Goal: Optimal Maternal-Fetal Wellbeing  Outcome: Ongoing, Progressing     Problem: Pain Acute  Goal: Acceptable Pain Control and Functional Ability  Outcome: Ongoing, Progressing     Problem: Device-Related Complication Risk (Anesthesia/Analgesia, Neuraxial)  Goal: Safe Infusion Delivery Completion  Outcome: Ongoing, Progressing     Problem: Infection (Anesthesia/Analgesia, Neuraxial)  Goal: Absence of Infection Signs and Symptoms  Outcome: Ongoing, Progressing     Problem: Nausea and Vomiting (Anesthesia/Analgesia, Neuraxial)  Goal: Nausea and Vomiting Relief  Outcome: Ongoing, Progressing     Problem: Pain (Anesthesia/Analgesia, Neuraxial)  Goal: Effective Pain Control  Outcome: Ongoing, Progressing     Problem: Respiratory Compromise (Anesthesia/Analgesia, Neuraxial)  Goal: Effective Oxygenation and Ventilation  Outcome: Ongoing, Progressing      Problem: Sensorimotor Impairment (Anesthesia/Analgesia, Neuraxial)  Goal: Baseline Motor Function  Outcome: Ongoing, Progressing     Problem: Urinary Retention (Anesthesia/Analgesia, Neuraxial)  Goal: Effective Urinary Elimination  Outcome: Ongoing, Progressing     Problem: Adjustment to Role Transition (Postpartum  Delivery)  Goal: Successful Maternal Role Transition  Outcome: Ongoing, Progressing     Problem: Bleeding (Postpartum  Delivery)  Goal: Hemostasis  Outcome: Ongoing, Progressing     Problem: Infection (Postpartum  Delivery)  Goal: Absence of Infection Signs and Symptoms  Outcome: Ongoing, Progressing     Problem: Pain (Postpartum  Delivery)  Goal: Acceptable Pain Control  Outcome: Ongoing, Progressing     Problem: Postoperative Nausea and Vomiting (Postpartum  Delivery)  Goal: Nausea and Vomiting Relief  Outcome: Ongoing, Progressing     Problem: Postoperative Urinary Retention (Postpartum  Delivery)  Goal: Effective Urinary Elimination  Outcome: Ongoing, Progressing

## 2023-12-04 VITALS
WEIGHT: 157.88 LBS | HEART RATE: 100 BPM | OXYGEN SATURATION: 98 % | RESPIRATION RATE: 16 BRPM | BODY MASS INDEX: 25.37 KG/M2 | SYSTOLIC BLOOD PRESSURE: 119 MMHG | DIASTOLIC BLOOD PRESSURE: 64 MMHG | TEMPERATURE: 98 F | HEIGHT: 66 IN

## 2023-12-04 PROBLEM — Z98.51 STATUS POST TUBAL LIGATION: Status: ACTIVE | Noted: 2023-12-04

## 2023-12-04 PROBLEM — Z98.891 STATUS POST PRIMARY LOW TRANSVERSE CESAREAN SECTION: Status: ACTIVE | Noted: 2023-12-04

## 2023-12-04 LAB
BLD PROD TYP BPU: NORMAL
BLOOD UNIT EXPIRATION DATE: NORMAL
BLOOD UNIT TYPE CODE: 600
BLOOD UNIT TYPE: NORMAL
CODING SYSTEM: NORMAL
CROSSMATCH INTERPRETATION: NORMAL
DISPENSE STATUS: NORMAL
NUM UNITS TRANS PACKED RBC: NORMAL

## 2023-12-04 PROCEDURE — 25000003 PHARM REV CODE 250

## 2023-12-04 PROCEDURE — 25000003 PHARM REV CODE 250: Performed by: STUDENT IN AN ORGANIZED HEALTH CARE EDUCATION/TRAINING PROGRAM

## 2023-12-04 PROCEDURE — 99238 PR HOSPITAL DISCHARGE DAY,<30 MIN: ICD-10-PCS | Mod: ,,, | Performed by: OBSTETRICS & GYNECOLOGY

## 2023-12-04 PROCEDURE — 99238 HOSP IP/OBS DSCHRG MGMT 30/<: CPT | Mod: ,,, | Performed by: OBSTETRICS & GYNECOLOGY

## 2023-12-04 RX ORDER — IBUPROFEN 600 MG/1
600 TABLET ORAL EVERY 6 HOURS PRN
Qty: 60 TABLET | Refills: 0 | Status: SHIPPED | OUTPATIENT
Start: 2023-12-04

## 2023-12-04 RX ORDER — SENNOSIDES 8.6 MG/1
1 TABLET ORAL DAILY PRN
Qty: 60 TABLET | Refills: 0 | Status: SHIPPED | OUTPATIENT
Start: 2023-12-04 | End: 2023-12-04 | Stop reason: SDUPTHER

## 2023-12-04 RX ORDER — SENNOSIDES 8.6 MG/1
1 TABLET ORAL DAILY PRN
Qty: 60 TABLET | Refills: 0 | Status: SHIPPED | OUTPATIENT
Start: 2023-12-04

## 2023-12-04 RX ORDER — IBUPROFEN 600 MG/1
600 TABLET ORAL EVERY 6 HOURS PRN
Qty: 60 TABLET | Refills: 0 | Status: SHIPPED | OUTPATIENT
Start: 2023-12-04 | End: 2023-12-04 | Stop reason: SDUPTHER

## 2023-12-04 RX ORDER — DEXTROMETHORPHAN HYDROBROMIDE, GUAIFENESIN 5; 100 MG/5ML; MG/5ML
650 LIQUID ORAL EVERY 6 HOURS PRN
Qty: 60 TABLET | Refills: 0 | Status: SHIPPED | OUTPATIENT
Start: 2023-12-04 | End: 2023-12-04 | Stop reason: SDUPTHER

## 2023-12-04 RX ORDER — OXYCODONE HYDROCHLORIDE 5 MG/1
5 TABLET ORAL EVERY 4 HOURS PRN
Qty: 20 TABLET | Refills: 0 | Status: SHIPPED | OUTPATIENT
Start: 2023-12-04

## 2023-12-04 RX ORDER — OXYCODONE HYDROCHLORIDE 5 MG/1
5 TABLET ORAL EVERY 4 HOURS PRN
Qty: 20 TABLET | Refills: 0 | Status: SHIPPED | OUTPATIENT
Start: 2023-12-04 | End: 2023-12-04 | Stop reason: SDUPTHER

## 2023-12-04 RX ORDER — FAMOTIDINE 20 MG/1
20 TABLET, FILM COATED ORAL 2 TIMES DAILY
Status: DISCONTINUED | OUTPATIENT
Start: 2023-12-04 | End: 2023-12-04 | Stop reason: HOSPADM

## 2023-12-04 RX ORDER — DEXTROMETHORPHAN HYDROBROMIDE, GUAIFENESIN 5; 100 MG/5ML; MG/5ML
650 LIQUID ORAL EVERY 6 HOURS PRN
Qty: 60 TABLET | Refills: 0 | Status: SHIPPED | OUTPATIENT
Start: 2023-12-04

## 2023-12-04 RX ADMIN — IBUPROFEN 800 MG: 400 TABLET ORAL at 04:12

## 2023-12-04 RX ADMIN — OXYCODONE HYDROCHLORIDE 5 MG: 5 TABLET ORAL at 06:12

## 2023-12-04 RX ADMIN — ACETAMINOPHEN 650 MG: 325 TABLET, FILM COATED ORAL at 11:12

## 2023-12-04 RX ADMIN — ACETAMINOPHEN 650 MG: 325 TABLET, FILM COATED ORAL at 04:12

## 2023-12-04 RX ADMIN — SIMETHICONE 80 MG: 80 TABLET, CHEWABLE ORAL at 03:12

## 2023-12-04 RX ADMIN — PRENATAL VIT W/ FE FUMARATE-FA TAB 27-0.8 MG 1 TABLET: 27-0.8 TAB at 08:12

## 2023-12-04 RX ADMIN — OXYCODONE HYDROCHLORIDE 5 MG: 5 TABLET ORAL at 08:12

## 2023-12-04 RX ADMIN — OXYCODONE HYDROCHLORIDE 5 MG: 5 TABLET ORAL at 04:12

## 2023-12-04 RX ADMIN — OXYCODONE HYDROCHLORIDE 10 MG: 10 TABLET ORAL at 01:12

## 2023-12-04 RX ADMIN — FERROUS SULFATE TAB 325 MG (65 MG ELEMENTAL FE) 1 EACH: 325 (65 FE) TAB at 08:12

## 2023-12-04 RX ADMIN — NIFEDIPINE 30 MG: 30 TABLET, FILM COATED, EXTENDED RELEASE ORAL at 08:12

## 2023-12-04 RX ADMIN — IBUPROFEN 800 MG: 400 TABLET ORAL at 08:12

## 2023-12-04 RX ADMIN — DOCUSATE SODIUM 200 MG: 100 CAPSULE, LIQUID FILLED ORAL at 08:12

## 2023-12-04 NOTE — PROGRESS NOTES
POSTPARTUM PROGRESS NOTE    Subjective:     PPD/POD#: 3   Procedure: Primary LTCS   EGA: 28w3d   N/V: No   F/C: No   Abd Pain: Mild, well-controlled with oral pain medication   Lochia: Mild   Voiding: Yes   Ambulating: Yes   Bowel fnc: Yes   Contraception: BTL with C/S     Objective:      Temp:  [97.8 °F (36.6 °C)-98.1 °F (36.7 °C)] 97.9 °F (36.6 °C)  Pulse:  [] 95  Resp:  [15-20] 18  SpO2:  [97 %-99 %] 97 %  BP: (103-135)/(52-78) 135/66    Abdomen: Soft, appropriately tender   Uterus: Firm, no fundal tenderness   Incision: Bandage in place without shadowing     Lab Review    Recent Labs   Lab 11/28/23  0212 11/30/23  0452   * 135*   K 4.0 3.1*    110   CO2 19* 19*   BUN 8 12   CREATININE 0.6 0.6    117*   PROT 6.7 5.4*   BILITOT 0.1 0.1   ALKPHOS 106 93   ALT 16 32   AST 24 37   MG  --  1.7         Recent Labs   Lab 12/01/23  1237 12/02/23  0453 12/03/23  0829   WBC 21.31* 16.79* 20.60*   HGB 8.7* 7.0* 8.3*   HCT 28.1* 22.5* 26.7*   MCV 86 86 87    296 360           I/O  No intake or output data in the 24 hours ending 12/04/23 0619     Assessment and Plan:   Postpartum care  - Patient doing well.  - Continue routine management and advances.    Anemia, PPH  - H/H as above  - QBL: 1025 mL  - Asymptomatic   - iron/colace    gHTN  - BP as above  - asymptomatic  - preE labs as above  - adequate  - Mag: not indicated  - Hypertensive agent: Procardia 30 initiated 12/3. BP controlled with this regimen    Poor social situation  - Patient currently experiencing homelessness and staying with a friend  - Patient currently does not have custody of her 7 other children  - Reports she wishes to parent this child  - Social work has been following, appreciate assistance    Rh negative  - Patient has not received rho quan this pregnancy  - S/p Rho quan on admit, Rho quan work up pp  - Similarly to above (ABLA), patient adamantly declined rhogam     History of drug abuse  - Utox at OSH  +Methamphetamines  - Patient last used two days prior to admission, denies symptoms of withdrawal   - Denies ever experiencing withdrawal  - Social work has been following, appreciate assistance       Sandra Langford MD  OBGYN, PGY-3

## 2023-12-04 NOTE — PLAN OF CARE
Copied from baby's chart    SOCIAL WORK DISCHARGE PLANNING ASSESSMENT     SW completed discharge planning assessment with pt's mother in mother's room 638 .  Pt's mother was easily engaged. Education on the role of  was provided. Emotional support provided throughout assessment.     SW and mom discussed mom testing positive for amphetamines and the pending results for Pola. Mom asked if she would be allowed to take this baby home. SW informed her that it is up for the state to determine. Mom educated on DCFS mandated reporting. Mom voiced understanding. Mom stated she did not know she was pregnant for the majority of her pregnancy, thought it was a gas bubble. Mom plans on signing temporary custody over to a woman named Unique. She does not know Unique's last name or her address/phone number. Mom and Unique plan to visit NICU tomorrow with custody papers and talk more with SW.      Legal Name: Pola Brown         :  2023  Address: mom unsure of address at this time, will provide later  Parent's Phone Numbers: Nery Brown [phone]     Pediatrician: None Selected. [facility] list provided.    Education: Information given on NICU Education Classes; Physician/NNP daily rounds; and Postpartum Depression signs.   Potential Eligibility for SSI Benefits: No            Patient Active Problem List   Diagnosis    Alteration in nutrition    Prematurity, 1,250-1,499 grams, 27-28 completed weeks    Respiratory distress in     Need for observation and evaluation of  for sepsis    Healthcare maintenance    History of vascular access device    Maternal drug abuse    Apnea of prematurity     hyperbilirubinemia            Birth Hospital:[facility]           SOO: 24     Birth Weight:   1.36 kg (3 lb)              Birth Length: 35.3 cm                      Gestational Age: 28w3d           Apgars    Living status: Living  Apgar Component Scores:  1 min.:  5  min.:  10 min.:  15 min.:  20 min.:    Skin color:  0  1  1        Heart rate:  1  2  2        Reflex irritability:  0  1  2        Muscle tone:  1  1  1        Respiratory effort:  1  1  2        Total:  3  6  8        Apgars assigned by: NICU              12/04/23 1412   NICU Assessment   Assessment Type Discharge Planning Assessment   Source of Information family   Verified Demographic and Insurance Information Yes   Insurance Medicaid   Pastoral Care/Clergy/ Contact Status none needed   Lives With mother   Number people in home 1 including pt   Relationship Status of Parents None   Primary Source of Support/Comfort nonrelative caregiver   Other children (include names and ages) mom does not have custody of her other children,did not want to disclose their names   Mother Employed No   Mother's Employer unemployed   Highest Level of Education Some High School   Father's Involvement Limited   Is Father signing the birth certificate No   Family Involvement Minimal   Infant Feeding Plan formula feeding   Does baby have crib or safe sleep space? No   Plans to obtain crib by discharge Plans to obtain by discharge   Do you have a car seat? No   Resource/Environmental Concerns environmental;reliable transportation   Environment Concerns no permanent residence   Transportation Concerns no car;rides, unreliable from others   Potential Discharge Needs Early Intervention Program   Resources/Education Provided [facility] Financial Services;Support Resources for NICU Families;Post Partum Depression;My Preemi Ferm[facility];My NICU Baby Ferm[facility];Early Intervention Program;WIC;Preparing for Your Baby's Discharge Home;Glossary of Commonly Used Terms;Medicaid transportation   DCFS    (Will notify when meconium results)   Discharge Plan A Foster Home;Early Steps

## 2023-12-04 NOTE — DISCHARGE SUMMARY
[facility] ([place])  Obstetrics  Discharge Summary      Patient Name: Nery Brown  MRN: [id number]  Admission Date: 2023  Hospital Length of Stay: 6 days  Discharge Date and Time:  2023 12:03 PM  Attending Physician: Gulshan Ybarra MD   Discharging Provider: Sandra Langford MD   Primary Care Provider: Inna, Primary Doctor    HPI: Nery Brown is a 38 y.o. [id number] at 28 weeks 6 days dated by 28 week ultrasound presents as transfer from [place] in the setting of PPROM. Patient presented to ED reporting gush of fluid when sitting on the toilet at  with associated bleeding. At OSH, patient found to be grossly ruptured with blood tinged fluid, ROM+ collected and positive, cervical exam at that time /-3. BSUS growth performed and noted to be approximately 28w6d gestation and vertex. Patient was startedon PPROM antibiotics (Azithro x1 and Ampicillin x1) and MgSO4 for fetal neuroprotection and transferred to [facility] for higher level of care.     In [facility], patient resting comfortably in bed. Reports continued leaking of blood tinged fluid. She denies contractions at this time. Patient had  in 2023 and never had return of menses. Of note, patient is poor historian and realized she was pregnant when she felt fetal movements however cannot remember when that was. This pregnancy is complicated by history of substance abuse (Utox positive for methamphetamines, last used two days ago, denies other drug or alcohol use), poor social situation (patient currently experiencing homelessness, living with a friend and does not have custody of her other children), history of PTD x2, no prenatal care, and Rh negative status. Of note, patient's blood pressure noted to be elevated in [facility]. Patient denies symptoms of PreE. Reports possible history of elevated blood pressure in previous pregnancy and was started on a blood pressure medication which she self discontinued.              Procedure(s) (LRB):   SECTION (N/A)   SECTION, WITH TUBAL LIGATION (N/A)     Hospital Course:   2023 Presented to [facility] in the setting of concern for rupture of membranes. Noted to be grossly ruptured with blood tinged fluid, SVE 50/-3. BSUS approximately 28 weeks gestation. Started on MgSO4 and started on PPROM antibiotics.   2023 Arrival to [facility]. Severe range x1 on initial presentation followed by mild range BP. Patient denies symptoms of PreE. Patient with continued LOF but denies contractions. Exam with blood tinged fluid in vault. Visually 1 cm. BSUS vertex with MVP 2.68. Admit to L&D for PPROM abx and continue MgSO4 for fetal neuroprotection. .  2023: De-escalated yesterday due to reassuring monitoring and exam. Anatomy/growth yesterday consistent with 28wga (now 28w1d today). Visualized anatomy wnl. VSS, asymptomatic. No s/s of IAI,  labor. Continue inpatient management.  2023: Afebrile, VSS. No signs of IAI, PTL. Continue PPROM abx D #3. Episode of leg pain yesterday, resolved. LE dopplers negative for DVT.   2023: Episode of vaginal bleeding overnight with recurrent decelerations, urgent  delivery performed. PPH with QBL 1025mL, otherwise uncomplicated.   2023-2023: POD #1-3. Meeting all postop milestones. H/H stable. Will arrange for BP check and postpartum care at [facility]. Stable for discharge.     Consults (From admission, onward)          Status Ordering Provider     Inpatient consult to Lactation  Once        Provider:  (Not yet assigned)    Acknowledged BAIRON TYLER     Inpatient consult to Anesthesiology  Once        Provider:  (Not yet assigned)    Acknowledged BAIRON TYLER     Inpatient consult to Social Work  Once        Provider:  (Not yet assigned)    DEONTE Mojica            Final Active Diagnoses:    Diagnosis Date Noted POA    PRINCIPAL PROBLEM:  Status post  primary low transverse  section [Z98.891] 2023 Not Applicable    Status post tubal ligation [Z98.51] 2023 Not Applicable     premature rupture of membranes (PPROM) with unknown onset of labor [O42.919] 2023 Yes    29 weeks gestation of pregnancy [Z3A.29] 2023 Not Applicable    Gestational hypertension [O13.9] 2023 Yes    Anemia affecting 10th pregnancy [O99.019, O09.40] 2023 Yes    Rh negative state in antepartum period [O26.899, Z67.91] 2023 Yes    Unwanted fertility [Z30.09] 2023 Yes    Poor social situation [Z65.9] 2023 Not Applicable    History of drug abuse [F19.11] 2017 Yes      Problems Resolved During this Admission:        Significant Diagnostic Studies: Labs: All labs within the past 24 hours have been reviewed  Recent Labs   Lab 23  1237 23  0453 23  0829   WBC 21.31* 16.79* 20.60*   HGB 8.7* 7.0* 8.3*   HCT 28.1* 22.5* 26.7*   MCV 86 86 87    296 360          Immunizations       Date Immunization Status Dose Route/Site Given by    07/18/15 0000 Tdap Given 0.5 mL Intramuscular/     23 0448 Rho (D) Immune Globulin - IM Given 300 mcg Intramuscular/Right deltoid Bk Preciado RN    23 0734 MMR Incomplete 0.5 mL Subcutaneous/     23 0734 Tdap Incomplete 0.5 mL Intramuscular/             Delivery:    Episiotomy:     Lacerations:     Repair suture:     Repair # of packets:     Blood loss (ml):       Birth information:  YOB: 2023   Time of birth: 7:06 AM   Sex: male   Delivery type: , Low Transverse   Gestational Age: 28w3d     Measurements    Weight: 1360 g  Weight (lbs): 3 lb  Length:          Delivery Clinician: Delivery Providers    Delivering clinician: Gulshan Ybarra MD   Provider Role    Sandra Langford MD Resident    Flori Shepard RN Circulator    Gale Garsia  [facility]    Teagan Austin MD Anesthesiologist    Robin Logan MD  Resident             Additional  information:  Forceps:    Vacuum:    Breech:    Observed anomalies      Living?:     Apgars    Living status: Living  Apgar Component Scores:  1 min.:  5 min.:  10 min.:  15 min.:  20 min.:    Skin color:  0  1  1      Heart rate:  1  2  2      Reflex irritability:  0  1  2      Muscle tone:  1  1  1      Respiratory effort:  1  1  2      Total:  3  6  8      Apgars assigned by: NICU         Placenta: Delivered:       appearance  Pending Diagnostic Studies:       Procedure Component Value Units Date/Time    Specimen to Pathology, Surgery Gynecology and Obstetrics [[id number]] Collected: 23 0856    Order Status: Sent Lab Status: In process Updated: 23    Specimen: Tissue             Discharged Condition: good    Disposition: Home or Self Care    Follow Up:   Follow-up Information       Sourav hearn WiltonFREDDY Follow up in 1 week(s).    Specialty: Obstetrics and Gynecology  Why: BP check  Contact information:  [address] [place], [place] [address]  [facility] [phone]             Sourav hearn WiltonFREDDY Follow up in 6 week(s).    Specialty: Obstetrics and Gynecology  Why: Postpartum Visit  Contact information:  [address]  [facility] [id number] [phone]                         Patient Instructions:      Diet Adult Regular     Lifting restrictions   Order Comments: No lifting more than 10-15 pounds     Notify your health care provider if you experience any of the following:   Order Comments: - Heavy vaginal bleeding soaking through more than 2 pads/hour for > 2 hours  - Severe abdominal pain  - Headache not relieved with Tylenol  - Vision changes  - Chest pain or shortness or breath     Notify your health care provider if you experience any of the following:  increased confusion or weakness     Notify your health care provider if you experience any of the following:  persistent dizziness, light-headedness, or  visual disturbances     Notify your health care provider if you experience any of the following:  severe persistent headache     Notify your health care provider if you experience any of the following:  difficulty breathing or increased cough     Notify your health care provider if you experience any of the following:  redness, tenderness, or signs of infection (pain, swelling, redness, odor or green/yellow discharge around incision site)     Notify your health care provider if you experience any of the following:  severe uncontrolled pain     Notify your health care provider if you experience any of the following:  persistent nausea and vomiting or diarrhea     Notify your health care provider if you experience any of the following:  temperature >100.4     Reason for not Ordering Smoking Cessation Referral     Order Specific Question Answer Comments   Reason for not ordering: Patient refused      Reason for not Prescribing Nicotine Replacement     Order Specific Question Answer Comments   Reason for not Prescribing: Patient refused      Medications:  Current Discharge Medication List        START taking these medications    Details   acetaminophen (TYLENOL) 650 MG TbSR Take 1 tablet (650 mg total) by mouth every 6 (six) hours as needed (pain).  Qty: 60 tablet, Refills: 0      oxyCODONE (ROXICODONE) 5 MG immediate release tablet Take 1 tablet (5 mg total) by mouth every 4 (four) hours as needed for Pain.  Qty: 20 tablet, Refills: 0    Comments: Quantity prescribed more than 7 day supply? No      senna (SENOKOT) 8.6 mg tablet Take 1 tablet by mouth daily as needed for Constipation.  Qty: 60 tablet, Refills: 0           CONTINUE these medications which have CHANGED    Details   ibuprofen (ADVIL,MOTRIN) 600 MG tablet Take 1 tablet (600 mg total) by mouth every 6 (six) hours as needed for Pain.  Qty: 60 tablet, Refills: 0           CONTINUE these medications which have NOT CHANGED    Details   prenatal vit  87-iron-folic-dha (PRENATE MINI, FERR ASP GLYCIN,) 18-1-350 mg Cap Take 1 capsule by mouth once daily.  Qty: 30 capsule, Refills: 12    Associated Diagnoses: Pregnancy examination or test, positive result             Sandra Langford MD  Obstetrics  [facility])

## 2023-12-04 NOTE — PLAN OF CARE
VSS. Patient ambulating, voiding, and passing flatus. Fundus firm, midline, with light lochia. Pain controlled with oral pain medications. Educated on signs of Pre-E and VTE PP. No additional information at this time.

## 2023-12-04 NOTE — PLAN OF CARE
12/04/23 1253   Final Note   Assessment Type Final Discharge Note   Anticipated Discharge Disposition Home   Post-Acute Status   Discharge Delays None known at this time

## 2023-12-04 NOTE — PLAN OF CARE
Pt VSS. Pain controlled with oral pain medication. Fundus firm and midline with light lochia rubra. LTV dressing in place, clean/dry/intact. Voiding spontaneously with adequate output. Passing gas. DCT done. Pt to be discharged home.    Problem:  Fall Injury Risk  Goal: Absence of Fall, Infant Drop and Related Injury  Outcome: Met     Problem: Adult Inpatient Plan of Care  Goal: Plan of Care Review  Outcome: Met  Goal: Patient-Specific Goal (Individualized)  Outcome: Met  Goal: Absence of Hospital-Acquired Illness or Injury  Outcome: Met  Goal: Optimal Comfort and Wellbeing  Outcome: Met  Goal: Readiness for Transition of Care  Outcome: Met     Problem: Infection  Goal: Absence of Infection Signs and Symptoms  Outcome: Met     Problem: Prelabor Rupture of Membranes  Goal: Delayed Delivery with Absence of Infection  Outcome: Met     Problem: Maternal-Fetal Wellbeing  Goal: Optimal Maternal-Fetal Wellbeing  Outcome: Met     Problem: Pain Acute  Goal: Acceptable Pain Control and Functional Ability  Outcome: Met     Problem: Device-Related Complication Risk (Anesthesia/Analgesia, Neuraxial)  Goal: Safe Infusion Delivery Completion  Outcome: Met     Problem: Infection (Anesthesia/Analgesia, Neuraxial)  Goal: Absence of Infection Signs and Symptoms  Outcome: Met     Problem: Nausea and Vomiting (Anesthesia/Analgesia, Neuraxial)  Goal: Nausea and Vomiting Relief  Outcome: Met     Problem: Pain (Anesthesia/Analgesia, Neuraxial)  Goal: Effective Pain Control  Outcome: Met     Problem: Respiratory Compromise (Anesthesia/Analgesia, Neuraxial)  Goal: Effective Oxygenation and Ventilation  Outcome: Met     Problem: Sensorimotor Impairment (Anesthesia/Analgesia, Neuraxial)  Goal: Baseline Motor Function  Outcome: Met     Problem: Urinary Retention (Anesthesia/Analgesia, Neuraxial)  Goal: Effective Urinary Elimination  Outcome: Met     Problem: Adjustment to Role Transition (Postpartum  Delivery)  Goal: Successful  Maternal Role Transition  Outcome: Met     Problem: Bleeding (Postpartum  Delivery)  Goal: Hemostasis  Outcome: Met     Problem: Infection (Postpartum  Delivery)  Goal: Absence of Infection Signs and Symptoms  Outcome: Met     Problem: Pain (Postpartum  Delivery)  Goal: Acceptable Pain Control  Outcome: Met     Problem: Postoperative Nausea and Vomiting (Postpartum  Delivery)  Goal: Nausea and Vomiting Relief  Outcome: Met     Problem: Postoperative Urinary Retention (Postpartum  Delivery)  Goal: Effective Urinary Elimination  Outcome: Met

## 2023-12-12 LAB
FINAL PATHOLOGIC DIAGNOSIS: NORMAL
GROSS: NORMAL
Lab: NORMAL

## 2024-02-21 ENCOUNTER — LACTATION ENCOUNTER (OUTPATIENT)
Dept: INTENSIVE CARE | Facility: OTHER | Age: 39
End: 2024-02-21

## 2024-02-21 NOTE — LACTATION NOTE
This note was copied from a baby's chart.  Lactation call to Ms.Leah Montano   NAHID discussed [employer] [place]'s guidelines on milk sharing in communities and the risks/benefits of such (from 4th Edition of Best Practices for Expressing,Storing and Handling Human Milk in Hospitals, Homes and  Settings-Padmini Cuellar). We also discussed the option of gelmix for thickening of human milk and the importance of NOT using this prior to 42 weeks corrected gestational age or beyond-to be determined by provider. NAHID referred  to outpatient SLP for education/use of this if/when she so determines to use it.

## 2025-07-16 NOTE — HPI
Patient presents to labor and delivery in the throws of active labor.  Patient states that she is had one prenatal visit although limited records are available.  Patient gives a history of this being her fourth pregnancy with 3 other  deliveries.  Patient reports a due date of 2017.  This would make the patient is 6 weeks.  Patient reports a history of drug abuse but states her last use was a month ago.  Patient appears to be under the influence at present.  Patient presented completely dilated and .  
I have personally evaluated and examined the patient. The Attending was available to me as a supervising provider if needed.